# Patient Record
Sex: FEMALE | Race: WHITE | NOT HISPANIC OR LATINO | Employment: FULL TIME | URBAN - NONMETROPOLITAN AREA
[De-identification: names, ages, dates, MRNs, and addresses within clinical notes are randomized per-mention and may not be internally consistent; named-entity substitution may affect disease eponyms.]

---

## 2021-11-09 ENCOUNTER — OFFICE VISIT (OUTPATIENT)
Dept: FAMILY MEDICINE CLINIC | Age: 31
End: 2021-11-09

## 2021-11-09 VITALS
HEIGHT: 63 IN | WEIGHT: 288.2 LBS | DIASTOLIC BLOOD PRESSURE: 79 MMHG | HEART RATE: 66 BPM | BODY MASS INDEX: 51.07 KG/M2 | SYSTOLIC BLOOD PRESSURE: 128 MMHG

## 2021-11-09 DIAGNOSIS — F41.9 ANXIETY: ICD-10-CM

## 2021-11-09 DIAGNOSIS — G56.03 BILATERAL CARPAL TUNNEL SYNDROME: Primary | ICD-10-CM

## 2021-11-09 PROCEDURE — 99203 OFFICE O/P NEW LOW 30 MIN: CPT | Performed by: NURSE PRACTITIONER

## 2021-11-09 RX ORDER — BUSPIRONE HYDROCHLORIDE 5 MG/1
5 TABLET ORAL 3 TIMES DAILY PRN
Qty: 30 TABLET | Refills: 1 | Status: SHIPPED | OUTPATIENT
Start: 2021-11-09 | End: 2023-03-08

## 2021-11-09 NOTE — PROGRESS NOTES
Chief Complaint  Wrist Pain (right worse than left)    Subjective    Patient is a 31 year old female in today with complaints of increased wrist pain that is keeping her up at night. Patient reports being diagnosed with carpel tunnel in the past and has tried braces without relief. Patient reports moderate relief with ibuprofen.    Patient also reports increased anxiety and feelings of being overwhelmed. Patient reports being a mother of four and recently moving states and life is a lot right now. Patient has been treated for depression in the past but came off of medications due to ineffectiveness and side effects. Patient reports she has never been to a counselor before.          Preston Walters presents to [facility]  Wrist Pain   The pain is present in the left wrist and right wrist. This is a chronic problem. The current episode started more than 1 month ago. The problem occurs intermittently. The problem has been waxing and waning. The quality of the pain is described as aching and burning. The pain is at a severity of 7/10. The pain is moderate. Associated symptoms include joint swelling, numbness, stiffness and tingling. Pertinent negatives include no fever. The symptoms are aggravated by lying down. She has tried NSAIDS, acetaminophen and rest for the symptoms. The treatment provided mild relief.   Anxiety  Presents for initial visit. Onset was more than 5 years ago. The problem has been gradually worsening. Symptoms include depressed mood, excessive worry, insomnia, nervous/anxious behavior and restlessness. Patient reports no chest pain or suicidal ideas. Symptoms occur most days. The severity of symptoms is moderate. The symptoms are aggravated by family issues. The quality of sleep is poor. Nighttime awakenings: occasional.     Risk factors include family history and a major life event. Her past medical history is significant for anxiety/panic attacks and depression.  "Past treatments include non-SSRI antidepressants, SSRIs and lifestyle changes. The treatment provided mild relief. Compliance with prior treatments has been good.       Objective   Vital Signs:   /79 (BP Location: Left arm, Patient Position: Sitting)   Pulse 66   Ht 160 cm (63\")   Wt 131 kg (288 lb 3.2 oz)   BMI 51.05 kg/m²     Physical Exam  HENT:      Head: Normocephalic.   Cardiovascular:      Rate and Rhythm: Normal rate and regular rhythm.      Pulses: Normal pulses.      Heart sounds: Normal heart sounds.   Pulmonary:      Effort: Pulmonary effort is normal. No respiratory distress.      Breath sounds: Normal breath sounds. No stridor. No wheezing, rhonchi or rales.   Musculoskeletal:      Right wrist: Tenderness and bony tenderness present. No swelling. Normal pulse.      Left wrist: Tenderness and bony tenderness present. No swelling. Normal pulse.   Skin:     General: Skin is warm and dry.   Neurological:      Mental Status: She is alert and oriented to person, place, and time.   Psychiatric:         Mood and Affect: Mood normal.        Result Review :                 Assessment and Plan    Diagnoses and all orders for this visit:    1. Bilateral carpal tunnel syndrome (Primary)  -     diclofenac (VOLTAREN) 50 MG EC tablet; Take 1 tablet by mouth 2 (Two) Times a Day As Needed (Wrist pain).  Dispense: 60 tablet; Refill: 1  -     Ambulatory Referral to Orthopedic Surgery    2. Anxiety  -     Ambulatory Referral to Psychiatry  -     busPIRone (BUSPAR) 5 MG tablet; Take 1 tablet by mouth 3 (Three) Times a Day As Needed (Anxiety).  Dispense: 30 tablet; Refill: 1        Follow Up   Return in 3 months (on 2/9/2022), or if symptoms worsen or fail to improve.  Patient was given instructions and counseling regarding her condition or for health maintenance advice. Please see specific information pulled into the AVS if appropriate.       "

## 2021-11-10 ENCOUNTER — TELEPHONE (OUTPATIENT)
Dept: FAMILY MEDICINE CLINIC | Age: 31
End: 2021-11-10

## 2021-11-10 NOTE — TELEPHONE ENCOUNTER
Spoke to pt and inf her that Luci is not here today but to not take either med and will check and call her back tomorrow

## 2021-11-10 NOTE — TELEPHONE ENCOUNTER
Caller: Preston Walters    Relationship: Self    Best call back number: [phone]    What is the best time to reach you: ANY    Who are you requesting to speak with (clinical staff, provider,  specific staff member): CLINICAL STAFF    What was the call regarding: PATIENT FOUND OUT SHE WAS PREGNANT AND WOULD LIKE TO SPEAK TO A NURSE ABOUT IF SHE IS SAFE TO TAKE HER MEDICATION    Do you require a callback: YES

## 2021-11-11 NOTE — TELEPHONE ENCOUNTER
Stop diclofenac, not advised during 1st trimester, buspirone not contraindicated. Keep scheduled appointment with ortho and psych to discuss appropriate treatments during pregnancy.

## 2021-11-29 ENCOUNTER — HOSPITAL ENCOUNTER (EMERGENCY)
Dept: HOSPITAL 49 - FER | Age: 31
LOS: 1 days | Discharge: HOME | End: 2021-11-30
Payer: COMMERCIAL

## 2021-11-29 DIAGNOSIS — Z23: ICD-10-CM

## 2021-11-29 DIAGNOSIS — Z3A.01: ICD-10-CM

## 2021-11-29 DIAGNOSIS — O98.511: Primary | ICD-10-CM

## 2021-11-29 DIAGNOSIS — O20.0: ICD-10-CM

## 2021-11-29 DIAGNOSIS — U07.1: ICD-10-CM

## 2021-11-29 DIAGNOSIS — Z88.7: ICD-10-CM

## 2021-11-29 PROCEDURE — M0243 CASIRIVI AND IMDEVI INFUSION: HCPCS

## 2021-11-29 PROCEDURE — U0002 COVID-19 LAB TEST NON-CDC: HCPCS

## 2021-11-30 LAB
ALBUMIN SERPL-MCNC: 3.4 G/DL (ref 3.4–5)
ALKALINE PHOSHATASE: 69 U/L (ref 46–116)
ALT SERPL-CCNC: 63 U/L (ref 14–59)
AST: 40 U/L (ref 15–37)
BILIRUBIN - TOTAL: 0.2 MG/DL (ref 0.2–1)
BILIRUBIN: NEGATIVE MG/DL
BLOOD: NEGATIVE ERY/UL
BUN SERPL-MCNC: 11 MG/DL (ref 7–18)
BUN/CREAT RATIO (CALC): 14.1 RATIO
CHLORIDE: 98 MMOL/L (ref 98–107)
CLARITY UR: CLEAR
CO2 (BICARBONATE): 27 MMOL/L (ref 21–32)
COLOR: YELLOW
CREATININE: 0.78 MG/DL (ref 0.51–0.95)
GLOBULIN (CALCULATION): 4.4 G/DL
GLUCOSE (U): NORMAL MG/DL
GLUCOSE SERPL-MCNC: 97 MG/DL (ref 74–106)
HCT: 40.1 % (ref 37–47)
HGB BLD-MCNC: 12.7 G/DL (ref 12.5–16)
LEUKOCYTES: NEGATIVE LEU/UL
MCH RBC QN AUTO: 28 PG (ref 25–31)
MCHC RBC AUTO-ENTMCNC: 31.7 G/DL (ref 32–36)
MCV: 88.5 FL (ref 78–100)
MPV: 11.7 FL (ref 6–9.5)
NITRITE: NEGATIVE MG/DL
PLT: 160 K/UL (ref 150–400)
POTASSIUM: 3.1 MMOL/L (ref 3.5–5.1)
PROTEIN: NEGATIVE MG/DL
RBC: 4.53 M/UL (ref 4.2–5.4)
RDW: 13.2 % (ref 11.5–14)
SPECIFIC GRAVITY: 1.02 (ref 1–1.03)
TOTAL PROTEIN: 7.8 G/DL (ref 6.4–8.2)
UROBILINOGEN: 0.2 MG/DL (ref 0.2–1)
WBC: 4.4 K/UL (ref 4–10.5)

## 2022-02-11 ENCOUNTER — TELEPHONE (OUTPATIENT)
Dept: FAMILY MEDICINE CLINIC | Age: 32
End: 2022-02-11

## 2022-02-11 NOTE — TELEPHONE ENCOUNTER
Caller: Preston Walters    Relationship: Self    Best call back number: [phone]    What medication are you requesting: PERMETHRIN CREAM     What are your current symptoms: PATIENT RECENTLY GOT A PUPPY AND THE VET CONFIRMED THE PUPPY HAS SCABIES. PATIENT DOES NOT HAVE ANY SYMPTOMS OF SCABIES BUT WANTS TO BE CAUTIOUS DUE TO BEING PREGNANT. PATIENT WOULD LIKE THIS CREAM JUST IN CASE SCABIES START TO DEVELOP      Have you had these symptoms before:    [x] Yes  [] No    Have you been treated for these symptoms before:   [x] Yes  [] No    If a prescription is needed, what is your preferred pharmacy and phone number:      [facility] - [place], [place] - [address] - [phone]  - [phone]   [phone]

## 2022-02-14 DIAGNOSIS — Z20.7 SCABIES EXPOSURE: Primary | ICD-10-CM

## 2022-02-14 RX ORDER — PERMETHRIN 50 MG/G
1 CREAM TOPICAL ONCE
Qty: 60 G | Refills: 1 | Status: SHIPPED | OUTPATIENT
Start: 2022-02-14 | End: 2022-02-14

## 2022-02-18 ENCOUNTER — HOSPITAL ENCOUNTER (EMERGENCY)
Dept: HOSPITAL 49 - FER | Age: 32
Discharge: HOME | End: 2022-02-18
Payer: COMMERCIAL

## 2022-02-18 DIAGNOSIS — Z79.82: ICD-10-CM
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
## 2022-02-18 DIAGNOSIS — S05.02XA: Primary | ICD-10-CM

## 2023-03-08 ENCOUNTER — PROCEDURE VISIT (OUTPATIENT)
Dept: FAMILY MEDICINE CLINIC | Age: 33
End: 2023-03-08
Payer: COMMERCIAL

## 2023-03-08 VITALS
HEART RATE: 64 BPM | HEIGHT: 63 IN | OXYGEN SATURATION: 98 % | DIASTOLIC BLOOD PRESSURE: 81 MMHG | SYSTOLIC BLOOD PRESSURE: 123 MMHG | BODY MASS INDEX: 51.91 KG/M2 | WEIGHT: 293 LBS

## 2023-03-08 DIAGNOSIS — F41.9 ANXIETY: ICD-10-CM

## 2023-03-08 DIAGNOSIS — G89.29 CHRONIC PAIN OF RIGHT KNEE: ICD-10-CM

## 2023-03-08 DIAGNOSIS — G56.03 BILATERAL CARPAL TUNNEL SYNDROME: Primary | ICD-10-CM

## 2023-03-08 DIAGNOSIS — D64.9 ANEMIA, UNSPECIFIED TYPE: ICD-10-CM

## 2023-03-08 DIAGNOSIS — M25.561 CHRONIC PAIN OF RIGHT KNEE: ICD-10-CM

## 2023-03-08 PROCEDURE — 20605 DRAIN/INJ JOINT/BURSA W/O US: CPT | Performed by: NURSE PRACTITIONER

## 2023-03-08 PROCEDURE — 99214 OFFICE O/P EST MOD 30 MIN: CPT | Performed by: NURSE PRACTITIONER

## 2023-03-08 RX ORDER — SERTRALINE HYDROCHLORIDE 100 MG/1
100 TABLET, FILM COATED ORAL DAILY
Qty: 90 TABLET | Refills: 0 | Status: SHIPPED | OUTPATIENT
Start: 2023-03-08

## 2023-03-08 RX ORDER — IBUPROFEN 800 MG/1
800 TABLET ORAL EVERY 6 HOURS PRN
Qty: 40 TABLET | Refills: 0 | Status: SHIPPED | OUTPATIENT
Start: 2023-03-08

## 2023-03-08 RX ORDER — FERROUS SULFATE 325(65) MG
325 TABLET ORAL
Qty: 90 TABLET | Refills: 0 | Status: SHIPPED | OUTPATIENT
Start: 2023-03-08

## 2023-03-08 RX ADMIN — TRIAMCINOLONE ACETONIDE 40 MG: 40 INJECTION, SUSPENSION INTRA-ARTICULAR; INTRAMUSCULAR at 13:47

## 2023-03-08 RX ADMIN — TRIAMCINOLONE ACETONIDE 40 MG: 40 INJECTION, SUSPENSION INTRA-ARTICULAR; INTRAMUSCULAR at 13:53

## 2023-03-08 NOTE — PROGRESS NOTES
"Chief Complaint  Procedure (Wrist and knee pain)    Subjective        Preston Walters presents to [facility]  Wrist Pain   The pain is present in the left wrist and right wrist. This is a recurrent problem. The current episode started more than 1 month ago. The quality of the pain is described as aching and burning. The pain is at a severity of 10/10. The pain is severe. Associated symptoms include joint locking, joint swelling, numbness, stiffness and tingling. She has tried NSAIDS, rest and cold (Braces) for the symptoms. The treatment provided no relief.   Anxiety  Presents for follow-up visit. Symptoms include excessive worry, irritability and nervous/anxious behavior. Symptoms occur most days. The severity of symptoms is moderate. The quality of sleep is fair.           Objective   Vital Signs:  /81 (BP Location: Left arm, Patient Position: Sitting, Cuff Size: Large Adult)   Pulse 64   Ht 160 cm (62.99\")   Wt 135 kg (297 lb 2 oz)   SpO2 98%   BMI 52.65 kg/m²   Estimated body mass index is 52.65 kg/m² as calculated from the following:    Height as of this encounter: 160 cm (62.99\").    Weight as of this encounter: 135 kg (297 lb 2 oz).             Physical Exam  HENT:      Head: Normocephalic.   Cardiovascular:      Rate and Rhythm: Normal rate and regular rhythm.   Pulmonary:      Effort: Pulmonary effort is normal. No respiratory distress.      Breath sounds: Normal breath sounds. No stridor. No wheezing, rhonchi or rales.   Musculoskeletal:      Right wrist: Tenderness present. Normal pulse.      Left wrist: Tenderness and crepitus present. Normal pulse.      Right knee: Swelling and crepitus present. No erythema. Normal pulse.   Skin:     General: Skin is warm.   Neurological:      Mental Status: She is alert and oriented to person, place, and time.   Psychiatric:         Mood and Affect: Mood normal.        Result Review :            Joint " Aspiration/Injection    Date/Time: 3/8/2023 1:47 PM  Performed by: Luci Sanchez APRN  Authorized by: Luci Sanchez APRN   Indications: pain   Body area: wrist  Joint: right wrist  Local anesthesia used: no    Anesthesia:  Local anesthesia used: no    Sedation:  Patient sedated: no    Preparation: Patient was prepped and draped in the usual sterile fashion.  Meds administered: 40 mg triamcinolone acetonide 40 MG/ML  Patient tolerance: patient tolerated the procedure well with no immediate complications  Comments: Discussed procedure, alternatives, possible complications and answered all questions. Written consent was obtained. Patient was in sitting position. No complications. Patient ambulatory at discharge.   1 cc 1% lidocaine NDC 23877-779-86 [id number] MAY 2025    Joint Aspiration/Injection    Date/Time: 3/8/2023 1:53 PM  Performed by: Luic Sanchez APRN  Authorized by: Luci Sanchez APRN   Indications: pain   Body area: wrist  Joint: left wrist  Local anesthesia used: no    Anesthesia:  Local anesthesia used: no    Sedation:  Patient sedated: no    Preparation: Patient was prepped and draped in the usual sterile fashion.  Needle size: 22 G  Meds administered: 40 mg triamcinolone acetonide 40 MG/ML  Patient tolerance: patient tolerated the procedure well with no immediate complications  Comments: Discussed procedure, alternatives, possible complications and answered all questions. Written consent was obtained. Patient was in sitting position. No complications. Patient ambulatory at discharge.   1 cc 1% lidocaine            Assessment and Plan   Diagnoses and all orders for this visit:    1. Bilateral carpal tunnel syndrome (Primary)  Comments:  Rest, ice, continue braces at night  Orders:  -     Joint Aspiration/Injection  -     Joint Aspiration/Injection    2. Anxiety  Comments:  Improved, would like to try increased dose of zoloft, follow up in 3 months  Orders:  -     sertraline (ZOLOFT) 100 MG  tablet; Take 1 tablet by mouth Daily.  Dispense: 90 tablet; Refill: 0    3. Chronic pain of right knee  -     ibuprofen (ADVIL,MOTRIN) 800 MG tablet; Take 1 tablet by mouth Every 6 (Six) Hours As Needed for Moderate Pain (Knee pain).  Dispense: 40 tablet; Refill: 0    4. Anemia, unspecified type  -     ferrous sulfate (FerrouSul) 325 (65 FE) MG tablet; Take 1 tablet by mouth Daily With Breakfast.  Dispense: 90 tablet; Refill: 0             Follow Up   Return in 3 months (on 6/8/2023), or if symptoms worsen or fail to improve.  Patient was given instructions and counseling regarding her condition or for health maintenance advice. Please see specific information pulled into the AVS if appropriate.

## 2023-03-10 RX ORDER — TRIAMCINOLONE ACETONIDE 40 MG/ML
40 INJECTION, SUSPENSION INTRA-ARTICULAR; INTRAMUSCULAR
Status: COMPLETED | OUTPATIENT
Start: 2023-03-08 | End: 2023-03-08

## 2023-03-27 ENCOUNTER — OFFICE VISIT (OUTPATIENT)
Dept: FAMILY MEDICINE CLINIC | Age: 33
End: 2023-03-27
Payer: COMMERCIAL

## 2023-03-27 VITALS
SYSTOLIC BLOOD PRESSURE: 136 MMHG | WEIGHT: 293 LBS | DIASTOLIC BLOOD PRESSURE: 77 MMHG | OXYGEN SATURATION: 98 % | TEMPERATURE: 98.3 F | HEIGHT: 63 IN | BODY MASS INDEX: 51.91 KG/M2 | HEART RATE: 60 BPM

## 2023-03-27 DIAGNOSIS — T36.95XA ANTIBIOTIC-INDUCED YEAST INFECTION: ICD-10-CM

## 2023-03-27 DIAGNOSIS — R05.9 COUGH, UNSPECIFIED TYPE: ICD-10-CM

## 2023-03-27 DIAGNOSIS — B37.9 ANTIBIOTIC-INDUCED YEAST INFECTION: ICD-10-CM

## 2023-03-27 DIAGNOSIS — H66.93 OTITIS OF BOTH EARS: Primary | ICD-10-CM

## 2023-03-27 DIAGNOSIS — J02.9 SORE THROAT: ICD-10-CM

## 2023-03-27 LAB
EXPIRATION DATE: NORMAL
EXPIRATION DATE: NORMAL
FLUAV AG UPPER RESP QL IA.RAPID: NOT DETECTED
FLUBV AG UPPER RESP QL IA.RAPID: NOT DETECTED
INTERNAL CONTROL: NORMAL
INTERNAL CONTROL: NORMAL
Lab: NORMAL
Lab: NORMAL
S PYO AG THROAT QL: NEGATIVE
SARS-COV-2 AG UPPER RESP QL IA.RAPID: NOT DETECTED

## 2023-03-27 PROCEDURE — 87081 CULTURE SCREEN ONLY: CPT | Performed by: NURSE PRACTITIONER

## 2023-03-27 RX ORDER — AMOXICILLIN 500 MG/1
500 CAPSULE ORAL 2 TIMES DAILY
Qty: 20 CAPSULE | Refills: 0 | Status: SHIPPED | OUTPATIENT
Start: 2023-03-27 | End: 2023-04-06

## 2023-03-27 RX ORDER — FLUCONAZOLE 150 MG/1
150 TABLET ORAL ONCE
Qty: 1 TABLET | Refills: 0 | Status: SHIPPED | OUTPATIENT
Start: 2023-03-27 | End: 2023-03-27

## 2023-03-27 NOTE — PROGRESS NOTES
"Chief Complaint  Sore Throat (Sx started 3 days ago), Headache, and Cough    Subjective        Preston Walters presents to [facility]  Sore Throat   This is a new problem. The current episode started in the past 7 days. The problem has been unchanged. Associated symptoms include congestion, coughing, headaches, a plugged ear sensation and swollen glands. Pertinent negatives include no shortness of breath or trouble swallowing. She has had no exposure to strep. She has tried acetaminophen and NSAIDs for the symptoms. The treatment provided mild relief.   Cough  This is a new problem. The current episode started in the past 7 days. The cough is productive of sputum. Associated symptoms include headaches and a sore throat. Pertinent negatives include no chills, fever or shortness of breath.       Objective   Vital Signs:  /77 (BP Location: Left arm, Patient Position: Sitting, Cuff Size: Adult)   Pulse 60   Temp 98.3 °F (36.8 °C) (Oral)   Ht 160 cm (62.99\")   Wt 135 kg (297 lb)   SpO2 98% Comment: room air  BMI 52.63 kg/m²   Estimated body mass index is 52.63 kg/m² as calculated from the following:    Height as of this encounter: 160 cm (62.99\").    Weight as of this encounter: 135 kg (297 lb).             Physical Exam  HENT:      Head: Normocephalic.      Right Ear: A middle ear effusion is present. Tympanic membrane is injected and erythematous.      Left Ear: Decreased hearing noted. A middle ear effusion is present. Tympanic membrane is injected, erythematous and bulging.      Nose: Congestion present.      Mouth/Throat:      Pharynx: Posterior oropharyngeal erythema present. No oropharyngeal exudate.   Cardiovascular:      Rate and Rhythm: Normal rate and regular rhythm.   Pulmonary:      Effort: Pulmonary effort is normal. No respiratory distress.      Breath sounds: Normal breath sounds. No stridor. No wheezing, rhonchi or rales.   Lymphadenopathy:      Cervical: " Cervical adenopathy present.   Skin:     General: Skin is warm and dry.   Neurological:      Mental Status: She is alert and oriented to person, place, and time.   Psychiatric:         Mood and Affect: Mood normal.        Result Review :          Results for orders placed or performed in visit on 03/27/23   POCT rapid strep A    Specimen: Swab   Result Value Ref Range    Rapid Strep A Screen Negative Negative, VALID, INVALID, Not Performed    Internal Control Passed Passed    Lot Number [id number]     Expiration Date 6/30/24    POCT SARS-CoV-2 Antigen CABRERA + Flu    Specimen: Swab   Result Value Ref Range    SARS Antigen Not Detected Not Detected, Presumptive Negative    Influenza A Antigen CABRERA Not Detected Not Detected    Influenza B Antigen CABRERA Not Detected Not Detected    Internal Control Passed Passed    Lot Number [id number]     Expiration Date 12/19/23               Assessment and Plan   Diagnoses and all orders for this visit:    1. Otitis of both ears (Primary)  -     amoxicillin (AMOXIL) 500 MG capsule; Take 1 capsule by mouth 2 (Two) Times a Day for 10 days.  Dispense: 20 capsule; Refill: 0    2. Sore throat  -     POCT SARS-CoV-2 Antigen CABRERA + Flu  -     Beta Strep Culture, Throat - , Throat; Future  -     Beta Strep Culture, Throat - Swab, Throat    3. Cough, unspecified type  -     POCT rapid strep A    4. Antibiotic-induced yeast infection  -     fluconazole (Diflucan) 150 MG tablet; Take 1 tablet by mouth 1 (One) Time for 1 dose.  Dispense: 1 tablet; Refill: 0             Follow Up   Return if symptoms worsen or fail to improve.  Patient was given instructions and counseling regarding her condition or for health maintenance advice. Please see specific information pulled into the AVS if appropriate.

## 2023-03-29 LAB — BACTERIA SPEC AEROBE CULT: NORMAL

## 2023-04-24 ENCOUNTER — HOSPITAL ENCOUNTER (OUTPATIENT)
Dept: GENERAL RADIOLOGY | Facility: HOSPITAL | Age: 33
Discharge: HOME | End: 2023-04-24
Admitting: NURSE PRACTITIONER
Payer: COMMERCIAL

## 2023-04-24 ENCOUNTER — OFFICE VISIT (OUTPATIENT)
Dept: FAMILY MEDICINE CLINIC | Age: 33
End: 2023-04-24
Payer: COMMERCIAL

## 2023-04-24 VITALS
BODY MASS INDEX: 51.91 KG/M2 | SYSTOLIC BLOOD PRESSURE: 126 MMHG | OXYGEN SATURATION: 97 % | HEART RATE: 63 BPM | WEIGHT: 293 LBS | DIASTOLIC BLOOD PRESSURE: 84 MMHG | HEIGHT: 63 IN

## 2023-04-24 DIAGNOSIS — M25.561 CHRONIC PAIN OF RIGHT KNEE: Primary | ICD-10-CM

## 2023-04-24 DIAGNOSIS — G89.29 CHRONIC PAIN OF RIGHT KNEE: Primary | ICD-10-CM

## 2023-04-24 DIAGNOSIS — F41.9 ANXIETY: ICD-10-CM

## 2023-04-24 DIAGNOSIS — G89.29 CHRONIC PAIN OF RIGHT KNEE: ICD-10-CM

## 2023-04-24 DIAGNOSIS — M25.561 CHRONIC PAIN OF RIGHT KNEE: ICD-10-CM

## 2023-04-24 PROCEDURE — 1159F MED LIST DOCD IN RCRD: CPT | Performed by: NURSE PRACTITIONER

## 2023-04-24 PROCEDURE — 1160F RVW MEDS BY RX/DR IN RCRD: CPT | Performed by: NURSE PRACTITIONER

## 2023-04-24 PROCEDURE — 73562 X-RAY EXAM OF KNEE 3: CPT

## 2023-04-24 RX ORDER — MELOXICAM 15 MG/1
15 TABLET ORAL DAILY
Qty: 90 TABLET | Refills: 0 | Status: SHIPPED | OUTPATIENT
Start: 2023-04-24

## 2023-04-24 NOTE — PROGRESS NOTES
"Chief Complaint  Knee Pain (Right knee pain worsening ongoing problem for \"years\")    Subjective        Preston Walters presents to [facility]  Knee Pain   The pain is present in the right knee and right thigh. The quality of the pain is described as aching. The pain is at a severity of 8/10. The pain has been worsening since onset. Pertinent negatives include no numbness or tingling. She reports no foreign bodies present. The symptoms are aggravated by movement and weight bearing. She has tried NSAIDs, heat and ice for the symptoms. The treatment provided mild relief.       Objective   Vital Signs:  /84 (BP Location: Left arm, Patient Position: Sitting, Cuff Size: Large Adult)   Pulse 63   Ht 160 cm (63\")   Wt 135 kg (298 lb)   SpO2 97%   BMI 52.79 kg/m²   Estimated body mass index is 52.79 kg/m² as calculated from the following:    Height as of this encounter: 160 cm (63\").    Weight as of this encounter: 135 kg (298 lb).             Physical Exam  Constitutional:       Appearance: She is obese.   HENT:      Head: Normocephalic.   Musculoskeletal:      Right knee: Swelling and crepitus present. No erythema. Tenderness present. Normal pulse.   Skin:     General: Skin is warm and dry.   Neurological:      Mental Status: She is alert and oriented to person, place, and time.   Psychiatric:         Mood and Affect: Mood normal.        Result Review :          PROCEDURE:  XR KNEE 3 VW RIGHT  (NONWEIGHTBEARING)     COMPARISON: None.     INDICATIONS:  Chronic generalized right knee pain.     FINDINGS:          BONES:             No significant arthropathy or acute abnormality.    SOFT TISSUES:            No visible soft tissue swelling.  No subcutaneous emphysema.  No retained radiopaque   foreign body.  EFFUSION:       Minimal, if any, joint effusion is suggested.  OTHER:             Negative.  If symptoms or clinical concerns persist, consider imaging follow-up.     "   IMPRESSION:               No acute fracture or acute malalignment.         Assessment and Plan   Diagnoses and all orders for this visit:    1. Chronic pain of right knee (Primary)  Comments:  Do not take ibuprofen with mobic, may get injection in June, compression, ice and elavtion   Orders:  -     XR Knee 3 View Right; Future  -     Diclofenac Sodium (VOLTAREN) 1 % gel gel; Apply 4 g topically to the appropriate area as directed 4 (Four) Times a Day As Needed (Knee pain).  Dispense: 350 g; Refill: 0  -     meloxicam (Mobic) 15 MG tablet; Take 1 tablet by mouth Daily.  Dispense: 90 tablet; Refill: 0    2. Anxiety  Comments:  Reports weight gain with zoloft would like to try wellbutrin, follow up in 3 months  Orders:  -     sertraline (ZOLOFT) 100 MG tablet; Take 0.5 tablets by mouth Daily.  Dispense: 7 tablet; Refill: 0  -     buPROPion XL (Wellbutrin XL) 150 MG 24 hr tablet; Take 1 tablet by mouth Daily.  Dispense: 90 tablet; Refill: 1    Will add Mobic and topical agent to decrease the amount of ibuprofen the patient is utilizing.  Discussed risk of frequent NSAID use.  Patient received carpal tunnel injection in March, can do knee joint injection as soon as June, patient will call make an appointment.  Will obtain x-ray today.  Discussed next best step such as physical therapy and orthopedics.  Patient wishes to avoid at this time due to lack of time to be off of work for appointments.         Follow Up   Return if symptoms worsen or fail to improve.  Patient was given instructions and counseling regarding her condition or for health maintenance advice. Please see specific information pulled into the AVS if appropriate.

## 2023-05-02 ENCOUNTER — TELEPHONE (OUTPATIENT)
Dept: FAMILY MEDICINE CLINIC | Age: 33
End: 2023-05-02

## 2023-05-02 PROBLEM — F32.A DEPRESSION AFFECTING PREGNANCY: Status: ACTIVE | Noted: 2021-12-23

## 2023-05-02 PROBLEM — O99.340 DEPRESSION AFFECTING PREGNANCY: Status: ACTIVE | Noted: 2021-12-23

## 2023-05-02 PROBLEM — U07.1 COVID-19 AFFECTING PREGNANCY IN FIRST TRIMESTER: Status: ACTIVE | Noted: 2022-01-20

## 2023-05-02 PROBLEM — O98.511 COVID-19 AFFECTING PREGNANCY IN FIRST TRIMESTER: Status: ACTIVE | Noted: 2022-01-20

## 2023-05-02 PROBLEM — O26.899 RH NEGATIVE STATE IN ANTEPARTUM PERIOD: Status: ACTIVE | Noted: 2021-11-23

## 2023-05-02 PROBLEM — O09.299 CURRENT SINGLETON PREGNANCY WITH HISTORY OF CONGENITAL HEART DISEASE IN PRIOR CHILD, ANTEPARTUM: Status: ACTIVE | Noted: 2020-04-21

## 2023-05-02 PROBLEM — O99.810 ABNORMAL O'SULLIVAN GLUCOSE CHALLENGE TEST, ANTEPARTUM: Status: ACTIVE | Noted: 2021-12-26

## 2023-05-02 PROBLEM — Z67.91 RH NEGATIVE STATE IN ANTEPARTUM PERIOD: Status: ACTIVE | Noted: 2021-11-23

## 2023-05-02 PROBLEM — Z34.90 PREGNANCY: Status: ACTIVE | Noted: 2019-10-18

## 2023-05-02 PROBLEM — O09.299 HISTORY OF GESTATIONAL DIABETES IN PRIOR PREGNANCY, CURRENTLY PREGNANT: Status: ACTIVE | Noted: 2021-12-23

## 2023-05-02 PROBLEM — O24.419 GESTATIONAL DIABETES MELLITUS: Status: ACTIVE | Noted: 2022-05-09

## 2023-05-02 PROBLEM — O09.299: Status: ACTIVE | Noted: 2021-12-23

## 2023-05-02 PROBLEM — E66.01 MORBID OBESITY: Status: ACTIVE | Noted: 2022-05-09

## 2023-05-02 PROBLEM — Z86.32 HISTORY OF GESTATIONAL DIABETES IN PRIOR PREGNANCY, CURRENTLY PREGNANT: Status: ACTIVE | Noted: 2021-12-23

## 2023-05-02 RX ORDER — SERTRALINE HYDROCHLORIDE 100 MG/1
50 TABLET, FILM COATED ORAL DAILY
Qty: 7 TABLET | Refills: 0 | Status: SHIPPED | OUTPATIENT
Start: 2023-05-02

## 2023-05-02 RX ORDER — BUPROPION HYDROCHLORIDE 150 MG/1
150 TABLET ORAL DAILY
Qty: 90 TABLET | Refills: 1 | Status: SHIPPED | OUTPATIENT
Start: 2023-05-02

## 2023-05-15 ENCOUNTER — OFFICE VISIT (OUTPATIENT)
Dept: FAMILY MEDICINE CLINIC | Age: 33
End: 2023-05-15
Payer: COMMERCIAL

## 2023-05-15 ENCOUNTER — LAB (OUTPATIENT)
Dept: LAB | Facility: HOSPITAL | Age: 33
End: 2023-05-15
Payer: COMMERCIAL

## 2023-05-15 VITALS
TEMPERATURE: 97.9 F | HEIGHT: 63 IN | WEIGHT: 293 LBS | HEART RATE: 64 BPM | DIASTOLIC BLOOD PRESSURE: 84 MMHG | SYSTOLIC BLOOD PRESSURE: 143 MMHG | BODY MASS INDEX: 51.91 KG/M2

## 2023-05-15 DIAGNOSIS — G89.29 CHRONIC PAIN OF RIGHT KNEE: ICD-10-CM

## 2023-05-15 DIAGNOSIS — F41.1 GAD (GENERALIZED ANXIETY DISORDER): ICD-10-CM

## 2023-05-15 DIAGNOSIS — Z00.00 ROUTINE ADULT HEALTH MAINTENANCE: ICD-10-CM

## 2023-05-15 DIAGNOSIS — Z13.220 SCREENING FOR LIPID DISORDERS: ICD-10-CM

## 2023-05-15 DIAGNOSIS — R53.83 OTHER FATIGUE: ICD-10-CM

## 2023-05-15 DIAGNOSIS — Z13.29 SCREENING FOR THYROID DISORDER: ICD-10-CM

## 2023-05-15 DIAGNOSIS — R73.01 ELEVATED FASTING GLUCOSE: ICD-10-CM

## 2023-05-15 DIAGNOSIS — R03.0 ELEVATED BLOOD PRESSURE READING: ICD-10-CM

## 2023-05-15 DIAGNOSIS — Z00.00 ENCOUNTER FOR MEDICAL EXAMINATION TO ESTABLISH CARE: Primary | ICD-10-CM

## 2023-05-15 DIAGNOSIS — D64.9 ANEMIA, UNSPECIFIED TYPE: ICD-10-CM

## 2023-05-15 DIAGNOSIS — M25.561 CHRONIC PAIN OF RIGHT KNEE: ICD-10-CM

## 2023-05-15 DIAGNOSIS — R09.82 PND (POST-NASAL DRIP): ICD-10-CM

## 2023-05-15 DIAGNOSIS — E55.9 VITAMIN D DEFICIENCY: ICD-10-CM

## 2023-05-15 DIAGNOSIS — E66.01 MORBID OBESITY: ICD-10-CM

## 2023-05-15 PROBLEM — O09.299 HISTORY OF GESTATIONAL DIABETES IN PRIOR PREGNANCY, CURRENTLY PREGNANT: Status: RESOLVED | Noted: 2021-12-23 | Resolved: 2023-05-15

## 2023-05-15 PROBLEM — Z34.90 PREGNANCY: Status: RESOLVED | Noted: 2019-10-18 | Resolved: 2023-05-15

## 2023-05-15 PROBLEM — F32.A DEPRESSION AFFECTING PREGNANCY: Status: RESOLVED | Noted: 2021-12-23 | Resolved: 2023-05-15

## 2023-05-15 PROBLEM — O24.419 GESTATIONAL DIABETES MELLITUS: Status: RESOLVED | Noted: 2022-05-09 | Resolved: 2023-05-15

## 2023-05-15 PROBLEM — O99.810 ABNORMAL O'SULLIVAN GLUCOSE CHALLENGE TEST, ANTEPARTUM: Status: RESOLVED | Noted: 2021-12-26 | Resolved: 2023-05-15

## 2023-05-15 PROBLEM — U07.1 COVID-19 AFFECTING PREGNANCY IN FIRST TRIMESTER: Status: RESOLVED | Noted: 2022-01-20 | Resolved: 2023-05-15

## 2023-05-15 PROBLEM — O09.299: Status: RESOLVED | Noted: 2021-12-23 | Resolved: 2023-05-15

## 2023-05-15 PROBLEM — F41.9 ANXIETY: Status: RESOLVED | Noted: 2021-11-09 | Resolved: 2023-05-15

## 2023-05-15 PROBLEM — O09.299 CURRENT SINGLETON PREGNANCY WITH HISTORY OF CONGENITAL HEART DISEASE IN PRIOR CHILD, ANTEPARTUM: Status: RESOLVED | Noted: 2020-04-21 | Resolved: 2023-05-15

## 2023-05-15 PROBLEM — O26.899 RH NEGATIVE STATE IN ANTEPARTUM PERIOD: Status: RESOLVED | Noted: 2021-11-23 | Resolved: 2023-05-15

## 2023-05-15 PROBLEM — O98.511 COVID-19 AFFECTING PREGNANCY IN FIRST TRIMESTER: Status: RESOLVED | Noted: 2022-01-20 | Resolved: 2023-05-15

## 2023-05-15 PROBLEM — O99.340 DEPRESSION AFFECTING PREGNANCY: Status: RESOLVED | Noted: 2021-12-23 | Resolved: 2023-05-15

## 2023-05-15 PROBLEM — Z67.91 RH NEGATIVE STATE IN ANTEPARTUM PERIOD: Status: RESOLVED | Noted: 2021-11-23 | Resolved: 2023-05-15

## 2023-05-15 PROBLEM — Z86.32 HISTORY OF GESTATIONAL DIABETES IN PRIOR PREGNANCY, CURRENTLY PREGNANT: Status: RESOLVED | Noted: 2021-12-23 | Resolved: 2023-05-15

## 2023-05-15 LAB
25(OH)D3 SERPL-MCNC: 17 NG/ML (ref 30–100)
ALBUMIN SERPL-MCNC: 3.9 G/DL (ref 3.5–5.2)
ALBUMIN/GLOB SERPL: 1.5 G/DL
ALP SERPL-CCNC: 73 U/L (ref 39–117)
ALT SERPL W P-5'-P-CCNC: 32 U/L (ref 1–33)
ANION GAP SERPL CALCULATED.3IONS-SCNC: 8.8 MMOL/L (ref 5–15)
AST SERPL-CCNC: 13 U/L (ref 1–32)
BASOPHILS # BLD AUTO: 0.04 10*3/MM3 (ref 0–0.2)
BASOPHILS NFR BLD AUTO: 0.4 % (ref 0–1.5)
BILIRUB SERPL-MCNC: <0.2 MG/DL (ref 0–1.2)
BUN SERPL-MCNC: 15 MG/DL (ref 6–20)
BUN/CREAT SERPL: 19.5 (ref 7–25)
CALCIUM SPEC-SCNC: 9.1 MG/DL (ref 8.6–10.5)
CHLORIDE SERPL-SCNC: 104 MMOL/L (ref 98–107)
CHOLEST SERPL-MCNC: 171 MG/DL (ref 0–200)
CO2 SERPL-SCNC: 26.2 MMOL/L (ref 22–29)
CREAT SERPL-MCNC: 0.77 MG/DL (ref 0.57–1)
DEPRECATED RDW RBC AUTO: 45 FL (ref 37–54)
EGFRCR SERPLBLD CKD-EPI 2021: 104 ML/MIN/1.73
EOSINOPHIL # BLD AUTO: 0.24 10*3/MM3 (ref 0–0.4)
EOSINOPHIL NFR BLD AUTO: 2.6 % (ref 0.3–6.2)
ERYTHROCYTE [DISTWIDTH] IN BLOOD BY AUTOMATED COUNT: 13.2 % (ref 12.3–15.4)
EXPIRATION DATE: NORMAL
FOLATE SERPL-MCNC: >20 NG/ML (ref 4.78–24.2)
GLOBULIN UR ELPH-MCNC: 2.6 GM/DL
GLUCOSE SERPL-MCNC: 109 MG/DL (ref 65–99)
HCT VFR BLD AUTO: 39 % (ref 34–46.6)
HDLC SERPL-MCNC: 47 MG/DL (ref 40–60)
HGB BLD-MCNC: 12.2 G/DL (ref 12–15.9)
IMM GRANULOCYTES # BLD AUTO: 0.01 10*3/MM3 (ref 0–0.05)
IMM GRANULOCYTES NFR BLD AUTO: 0.1 % (ref 0–0.5)
INTERNAL CONTROL: NORMAL
IRON 24H UR-MRATE: 64 MCG/DL (ref 37–145)
IRON SATN MFR SERPL: 14 % (ref 20–50)
LDLC SERPL CALC-MCNC: 106 MG/DL (ref 0–100)
LDLC/HDLC SERPL: 2.21 {RATIO}
LYMPHOCYTES # BLD AUTO: 2.39 10*3/MM3 (ref 0.7–3.1)
LYMPHOCYTES NFR BLD AUTO: 25.4 % (ref 19.6–45.3)
Lab: NORMAL
MCH RBC QN AUTO: 29 PG (ref 26.6–33)
MCHC RBC AUTO-ENTMCNC: 31.3 G/DL (ref 31.5–35.7)
MCV RBC AUTO: 92.6 FL (ref 79–97)
MONOCYTES # BLD AUTO: 0.59 10*3/MM3 (ref 0.1–0.9)
MONOCYTES NFR BLD AUTO: 6.3 % (ref 5–12)
NEUTROPHILS NFR BLD AUTO: 6.14 10*3/MM3 (ref 1.7–7)
NEUTROPHILS NFR BLD AUTO: 65.2 % (ref 42.7–76)
PLATELET # BLD AUTO: 293 10*3/MM3 (ref 140–450)
PMV BLD AUTO: 10.8 FL (ref 6–12)
POTASSIUM SERPL-SCNC: 4.9 MMOL/L (ref 3.5–5.2)
PROT SERPL-MCNC: 6.5 G/DL (ref 6–8.5)
RBC # BLD AUTO: 4.21 10*6/MM3 (ref 3.77–5.28)
S PYO AG THROAT QL: NEGATIVE
SODIUM SERPL-SCNC: 139 MMOL/L (ref 136–145)
TIBC SERPL-MCNC: 448 MCG/DL (ref 298–536)
TRANSFERRIN SERPL-MCNC: 301 MG/DL (ref 200–360)
TRIGL SERPL-MCNC: 101 MG/DL (ref 0–150)
TSH SERPL DL<=0.05 MIU/L-ACNC: 2.3 UIU/ML (ref 0.27–4.2)
VIT B12 BLD-MCNC: 475 PG/ML (ref 211–946)
VLDLC SERPL-MCNC: 18 MG/DL (ref 5–40)
WBC NRBC COR # BLD: 9.41 10*3/MM3 (ref 3.4–10.8)

## 2023-05-15 PROCEDURE — 36415 COLL VENOUS BLD VENIPUNCTURE: CPT

## 2023-05-15 PROCEDURE — 85025 COMPLETE CBC W/AUTO DIFF WBC: CPT

## 2023-05-15 PROCEDURE — 84443 ASSAY THYROID STIM HORMONE: CPT

## 2023-05-15 PROCEDURE — 84466 ASSAY OF TRANSFERRIN: CPT

## 2023-05-15 PROCEDURE — 83036 HEMOGLOBIN GLYCOSYLATED A1C: CPT

## 2023-05-15 PROCEDURE — 82306 VITAMIN D 25 HYDROXY: CPT

## 2023-05-15 PROCEDURE — 82607 VITAMIN B-12: CPT

## 2023-05-15 PROCEDURE — 80053 COMPREHEN METABOLIC PANEL: CPT

## 2023-05-15 PROCEDURE — 83540 ASSAY OF IRON: CPT

## 2023-05-15 PROCEDURE — 87081 CULTURE SCREEN ONLY: CPT | Performed by: NURSE PRACTITIONER

## 2023-05-15 PROCEDURE — 82746 ASSAY OF FOLIC ACID SERUM: CPT

## 2023-05-15 PROCEDURE — 80061 LIPID PANEL: CPT

## 2023-05-15 RX ORDER — IBUPROFEN 800 MG/1
800 TABLET ORAL EVERY 6 HOURS PRN
Qty: 40 TABLET | Refills: 0 | Status: SHIPPED | OUTPATIENT
Start: 2023-05-15

## 2023-05-15 NOTE — ASSESSMENT & PLAN NOTE
Since pt has been on wellbutrin for just 1 week, rec she stay on same dose for at least another 4-6 weeks. If BP not elevated, and she still feels increase is needed, we can adjust at follow up

## 2023-05-15 NOTE — ASSESSMENT & PLAN NOTE
Patient's (Body mass index is 52.61 kg/m².) indicates that they are morbidly/severely obese (BMI > 40 or > 35 with obesity - related health condition) with health conditions that include none . Weight is unchanged. BMI  is above average; BMI management plan is completed. We discussed portion control and increasing exercise.

## 2023-05-15 NOTE — PROGRESS NOTES
"Preston Walters presents to [facility] with complaint of  Anxiety (Discuss increasing Wellbutrin )    SUBJECTIVE  History of Present Illness     Patient is here to switch from DULCE Sanchez to myself. Her main reason for visit is to discuss depression. She was started on Wellbutrin 1 week ago and is wanting this medication increased. She does not express any side effects from medication. She is also needing motrin refilled. She says she uses this a few times per week for knee pain. She also gets knee injections. She also has been having a sore throat. No other symptoms.                          OBJECTIVE  Vital Signs:   /84 (BP Location: Left arm, Patient Position: Sitting)   Pulse 64   Temp 97.9 °F (36.6 °C) (Oral)   Ht 160 cm (63\")   Wt 135 kg (297 lb)   BMI 52.61 kg/m²       Physical Exam  Vitals reviewed.   Constitutional:       General: She is not in acute distress.     Appearance: Normal appearance. She is morbidly obese. She is not ill-appearing.   HENT:      Head: Normocephalic and atraumatic.      Nose: Nose normal.      Mouth/Throat:      Mouth: Mucous membranes are moist.      Pharynx: Oropharynx is clear.   Cardiovascular:      Rate and Rhythm: Normal rate and regular rhythm.      Pulses: Normal pulses.      Heart sounds: Normal heart sounds.   Pulmonary:      Effort: Pulmonary effort is normal.      Breath sounds: Normal breath sounds.   Musculoskeletal:      Cervical back: Neck supple.   Skin:     General: Skin is warm and dry.   Neurological:      General: No focal deficit present.      Mental Status: She is alert and oriented to person, place, and time. Mental status is at baseline.   Psychiatric:         Mood and Affect: Mood normal.         Behavior: Behavior normal.         Judgment: Judgment normal.        Results Review:   Lab on 05/15/2023   Component Date Value Ref Range Status   • WBC 05/15/2023 9.41  3.40 - 10.80 10*3/mm3 Final   • RBC 05/15/2023 4.21  " 3.77 - 5.28 10*6/mm3 Final   • Hemoglobin 05/15/2023 12.2  12.0 - 15.9 g/dL Final   • Hematocrit 05/15/2023 39.0  34.0 - 46.6 % Final   • MCV 05/15/2023 92.6  79.0 - 97.0 fL Final   • MCH 05/15/2023 29.0  26.6 - 33.0 pg Final   • MCHC 05/15/2023 31.3 (L)  31.5 - 35.7 g/dL Final   • RDW 05/15/2023 13.2  12.3 - 15.4 % Final   • RDW-SD 05/15/2023 45.0  37.0 - 54.0 fl Final   • MPV 05/15/2023 10.8  6.0 - 12.0 fL Final   • Platelets 05/15/2023 293  140 - 450 10*3/mm3 Final   • Neutrophil % 05/15/2023 65.2  42.7 - 76.0 % Final   • Lymphocyte % 05/15/2023 25.4  19.6 - 45.3 % Final   • Monocyte % 05/15/2023 6.3  5.0 - 12.0 % Final   • Eosinophil % 05/15/2023 2.6  0.3 - 6.2 % Final   • Basophil % 05/15/2023 0.4  0.0 - 1.5 % Final   • Immature Grans % 05/15/2023 0.1  0.0 - 0.5 % Final   • Neutrophils, Absolute 05/15/2023 6.14  1.70 - 7.00 10*3/mm3 Final   • Lymphocytes, Absolute 05/15/2023 2.39  0.70 - 3.10 10*3/mm3 Final   • Monocytes, Absolute 05/15/2023 0.59  0.10 - 0.90 10*3/mm3 Final   • Eosinophils, Absolute 05/15/2023 0.24  0.00 - 0.40 10*3/mm3 Final   • Basophils, Absolute 05/15/2023 0.04  0.00 - 0.20 10*3/mm3 Final   • Immature Grans, Absolute 05/15/2023 0.01  0.00 - 0.05 10*3/mm3 Final   Office Visit on 05/15/2023   Component Date Value Ref Range Status   • Rapid Strep A Screen 05/15/2023 Negative  Negative, VALID, INVALID, Not Performed Final   • Internal Control 05/15/2023 Passed  Passed Final   • Lot Number 05/15/2023 [id number]   Final   • Expiration Date 05/15/2023 6/30/24   Final         ASSESSMENT AND PLAN:  Diagnoses and all orders for this visit:    1. Encounter for medical examination to establish care (Primary)    2. PND (post-nasal drip)  Comments:  RSS neg, rec starting daily antihistamine, salt water gargles   Orders:  -     POCT rapid strep A  -     Beta Strep Culture, Throat - , Throat; Future  -     Beta Strep Culture, Throat - Swab, Throat    3. Chronic pain of right knee  -     ibuprofen  (ADVIL,MOTRIN) 800 MG tablet; Take 1 tablet by mouth Every 6 (Six) Hours As Needed for Moderate Pain (Knee pain).  Dispense: 40 tablet; Refill: 0    4. Routine adult health maintenance  -     CBC & Differential; Future  -     Comprehensive Metabolic Panel; Future    5. Screening for thyroid disorder  -     TSH; Future    6. Vitamin D deficiency  -     Vitamin D,25-Hydroxy; Future    7. Screening for lipid disorders  -     Lipid Panel; Future    8. Anemia, unspecified type  -     Iron Profile; Future    9. Other fatigue  -     Vitamin B12 & Folate; Future    10. FARZAD (generalized anxiety disorder)  Assessment & Plan:  Since pt has been on wellbutrin for just 1 week, rec she stay on same dose for at least another 4-6 weeks. If BP not elevated, and she still feels increase is needed, we can adjust at follow up      11. Elevated blood pressure reading  Comments:  may be due to wellbutrin, she was encouraged to check BP at home, notify office if const >135/85    12. Morbid obesity  Assessment & Plan:  Patient's (Body mass index is 52.61 kg/m².) indicates that they are morbidly/severely obese (BMI > 40 or > 35 with obesity - related health condition) with health conditions that include none . Weight is unchanged. BMI  is above average; BMI management plan is completed. We discussed portion control and increasing exercise.         Follow Up   Return in about 4 weeks (around 6/12/2023). Patient to notify office with any acute concerns or issues.  Patient verbalizes understanding, agrees with plan of care and has no further questions upon discharge.     Patient was given instructions and counseling regarding her condition or for health maintenance advice. Please see specific information pulled into the AVS if appropriate.     Discussed the importance of following up with any needed screening tests/labs/specialist appointments and any requested follow-up recommended by me today. Importance of maintaining follow-up discussed  and patient accepts that missed appointments can delay diagnosis and potentially lead to worsening of conditions.    Part of this note may be an electronic transcription/translation of spoken language to printed text using the Dragon Dictation System.

## 2023-05-16 LAB — HBA1C MFR BLD: 5.4 % (ref 4.8–5.6)

## 2023-05-17 LAB — BACTERIA SPEC AEROBE CULT: NORMAL

## 2023-05-18 DIAGNOSIS — E55.9 VITAMIN D DEFICIENCY: ICD-10-CM

## 2023-05-18 RX ORDER — ERGOCALCIFEROL 1.25 MG/1
50000 CAPSULE ORAL
Qty: 12 CAPSULE | Refills: 0 | Status: SHIPPED | OUTPATIENT
Start: 2023-05-18 | End: 2023-05-18 | Stop reason: SDUPTHER

## 2023-05-18 RX ORDER — ERGOCALCIFEROL 1.25 MG/1
50000 CAPSULE ORAL
Qty: 12 CAPSULE | Refills: 0 | Status: SHIPPED | OUTPATIENT
Start: 2023-05-18

## 2023-05-19 ENCOUNTER — OFFICE VISIT (OUTPATIENT)
Dept: FAMILY MEDICINE CLINIC | Age: 33
End: 2023-05-19
Payer: COMMERCIAL

## 2023-05-19 VITALS
DIASTOLIC BLOOD PRESSURE: 56 MMHG | TEMPERATURE: 98.4 F | WEIGHT: 293 LBS | OXYGEN SATURATION: 98 % | SYSTOLIC BLOOD PRESSURE: 109 MMHG | HEIGHT: 63 IN | HEART RATE: 64 BPM | BODY MASS INDEX: 51.91 KG/M2

## 2023-05-19 DIAGNOSIS — J06.9 VIRAL UPPER RESPIRATORY TRACT INFECTION: Primary | ICD-10-CM

## 2023-05-19 LAB
EXPIRATION DATE: NORMAL
FLUAV AG UPPER RESP QL IA.RAPID: NOT DETECTED
FLUBV AG UPPER RESP QL IA.RAPID: NOT DETECTED
INTERNAL CONTROL: NORMAL
Lab: NORMAL
SARS-COV-2 AG UPPER RESP QL IA.RAPID: NOT DETECTED

## 2023-05-19 PROCEDURE — 87428 SARSCOV & INF VIR A&B AG IA: CPT | Performed by: NURSE PRACTITIONER

## 2023-05-19 PROCEDURE — 1160F RVW MEDS BY RX/DR IN RCRD: CPT | Performed by: NURSE PRACTITIONER

## 2023-05-19 PROCEDURE — 99213 OFFICE O/P EST LOW 20 MIN: CPT | Performed by: NURSE PRACTITIONER

## 2023-05-19 PROCEDURE — 1159F MED LIST DOCD IN RCRD: CPT | Performed by: NURSE PRACTITIONER

## 2023-05-19 RX ORDER — METHYLPREDNISOLONE 4 MG/1
1 TABLET ORAL DAILY
Qty: 21 TABLET | Refills: 0 | Status: SHIPPED | OUTPATIENT
Start: 2023-05-19

## 2023-05-19 RX ORDER — BROMPHENIRAMINE MALEATE, PSEUDOEPHEDRINE HYDROCHLORIDE, AND DEXTROMETHORPHAN HYDROBROMIDE 2; 30; 10 MG/5ML; MG/5ML; MG/5ML
5 SYRUP ORAL 4 TIMES DAILY PRN
Qty: 118 ML | Refills: 0 | Status: SHIPPED | OUTPATIENT
Start: 2023-05-19 | End: 2023-05-26

## 2023-05-19 RX ORDER — ALBUTEROL SULFATE 90 UG/1
2 AEROSOL, METERED RESPIRATORY (INHALATION) EVERY 4 HOURS PRN
Qty: 18 G | Refills: 0 | Status: SHIPPED | OUTPATIENT
Start: 2023-05-19

## 2023-05-19 NOTE — PROGRESS NOTES
"Preston Walters presents to [facility] with complaint of  Cough (Sore throat, h/a, dizziness, congestion x 5 days )    SUBJECTIVE  Cough  This is a new problem. Episode onset: 5 days ago. The problem has been gradually worsening. The problem occurs every few minutes. The cough is non-productive. Associated symptoms include ear congestion, headaches, nasal congestion and postnasal drip. Pertinent negatives include no chest pain, fever, shortness of breath or wheezing. She has tried nothing for the symptoms.        OBJECTIVE  Vital Signs:   /56 (BP Location: Left arm, Patient Position: Sitting)   Pulse 64   Temp 98.4 °F (36.9 °C) (Oral)   Ht 160 cm (63\")   Wt 134 kg (295 lb 6.4 oz)   SpO2 98% Comment: room air  BMI 52.33 kg/m²       Physical Exam  Vitals reviewed.   Constitutional:       General: She is not in acute distress.     Appearance: Normal appearance. She is not ill-appearing.   HENT:      Head: Normocephalic and atraumatic.      Right Ear: Ear canal normal. A middle ear effusion is present.      Left Ear: Ear canal normal. A middle ear effusion is present.      Nose: Nose normal.      Mouth/Throat:      Mouth: Mucous membranes are moist.      Pharynx: Posterior oropharyngeal erythema present. No pharyngeal swelling.      Tonsils: No tonsillar exudate. 1+ on the right. 1+ on the left.   Cardiovascular:      Rate and Rhythm: Normal rate and regular rhythm.      Pulses: Normal pulses.      Heart sounds: Normal heart sounds.   Pulmonary:      Effort: Pulmonary effort is normal.      Breath sounds: Normal breath sounds.   Musculoskeletal:      Cervical back: Neck supple.   Skin:     General: Skin is warm and dry.   Neurological:      General: No focal deficit present.      Mental Status: She is alert and oriented to person, place, and time. Mental status is at baseline.   Psychiatric:         Mood and Affect: Mood normal.         Behavior: Behavior normal.         " Judgment: Judgment normal.          Results Review:  The following data was reviewed by Amanda Thurman, SASHA [unfilled] 13:48 EDT.    Office Visit on 05/19/2023   Component Date Value Ref Range Status   • SARS Antigen 05/19/2023 Not Detected  Not Detected, Presumptive Negative Final   • Influenza A Antigen CABRERA 05/19/2023 Not Detected  Not Detected Final   • Influenza B Antigen CABRERA 05/19/2023 Not Detected  Not Detected Final   • Internal Control 05/19/2023 Passed  Passed Final   • Lot Number 05/19/2023 [id number]   Final   • Expiration Date 05/19/2023 12/19/23   Final         ASSESSMENT AND PLAN:  Diagnoses and all orders for this visit:    1. Viral upper respiratory tract infection (Primary)  -     POCT SARS-CoV-2 Antigen CABRERA + Flu  -     brompheniramine-pseudoephedrine-DM 30-2-10 MG/5ML syrup; Take 5 mL by mouth 4 (Four) Times a Day As Needed for Congestion or Cough for up to 7 days.  Dispense: 118 mL; Refill: 0  -     methylPREDNISolone (MEDROL) 4 MG dose pack; Take 1 tablet by mouth Daily. Take as directed on package instructions.  Dispense: 21 tablet; Refill: 0  -     albuterol sulfate  (90 Base) MCG/ACT inhaler; Inhale 2 puffs Every 4 (Four) Hours As Needed for Wheezing.  Dispense: 18 g; Refill: 0      Patient was negative for strep at last visit, she was negative for flu and COVID today in office.  She was given Bromfed, Medrol pack, and albuterol inhaler to take as directed.  The patient was encouraged to increase rest and fluids. May take Tylenol for pain or fever. If symptoms persist 7 days or longer, antibiotic may be warranted and she was instructed to notify office.  Work note was provided.      Follow Up   Return if symptoms worsen or fail to improve, for Next scheduled follow up. Patient to notify office with any acute concerns or issues.  Patient verbalizes understanding, agrees with plan of care and has no further questions upon discharge.     Patient was given instructions and counseling regarding her  condition or for health maintenance advice. Please see specific information pulled into the AVS if appropriate.     Discussed the importance of following up with any needed screening tests/labs/specialist appointments and any requested follow-up recommended by me today. Importance of maintaining follow-up discussed and patient accepts that missed appointments can delay diagnosis and potentially lead to worsening of conditions.    Part of this note may be an electronic transcription/translation of spoken language to printed text using the Dragon Dictation System.

## 2023-05-23 ENCOUNTER — TELEPHONE (OUTPATIENT)
Dept: FAMILY MEDICINE CLINIC | Age: 33
End: 2023-05-23
Payer: COMMERCIAL

## 2023-05-23 DIAGNOSIS — E55.9 VITAMIN D DEFICIENCY: ICD-10-CM

## 2023-05-23 RX ORDER — ERGOCALCIFEROL 1.25 MG/1
50000 CAPSULE ORAL
Qty: 12 CAPSULE | Refills: 0 | Status: SHIPPED | OUTPATIENT
Start: 2023-05-23

## 2023-05-23 NOTE — TELEPHONE ENCOUNTER
Caller: Preston Walters    Relationship: Self    Best call back number: [phone]    What is the best time to reach you: ANYTIME     Who are you requesting to speak with (clinical staff, provider,  specific staff member): CLINICAL         What was the call regarding: PATIENT CALLING IN REGARDS TO A MEDICATION FOR VITAMIN D, TO BE SENT OVER TO [facility], PATIENT ADVISED PHARMACY HAS NOT RECEIVED A RX.     Do you require a callback: YES

## 2023-08-01 ENCOUNTER — TELEPHONE (OUTPATIENT)
Dept: FAMILY MEDICINE CLINIC | Age: 33
End: 2023-08-01
Payer: COMMERCIAL

## 2023-08-01 RX ORDER — SERTRALINE HYDROCHLORIDE 100 MG/1
100 TABLET, FILM COATED ORAL DAILY
OUTPATIENT
Start: 2023-08-01

## 2023-08-03 ENCOUNTER — LAB (OUTPATIENT)
Dept: LAB | Facility: HOSPITAL | Age: 33
End: 2023-08-03
Payer: COMMERCIAL

## 2023-08-03 ENCOUNTER — OFFICE VISIT (OUTPATIENT)
Dept: FAMILY MEDICINE CLINIC | Age: 33
End: 2023-08-03
Payer: COMMERCIAL

## 2023-08-03 VITALS
SYSTOLIC BLOOD PRESSURE: 114 MMHG | BODY MASS INDEX: 51.91 KG/M2 | HEIGHT: 63 IN | HEART RATE: 64 BPM | DIASTOLIC BLOOD PRESSURE: 75 MMHG | WEIGHT: 293 LBS

## 2023-08-03 DIAGNOSIS — E61.1 LOW IRON: ICD-10-CM

## 2023-08-03 DIAGNOSIS — E66.01 MORBID OBESITY: ICD-10-CM

## 2023-08-03 DIAGNOSIS — F41.1 GAD (GENERALIZED ANXIETY DISORDER): Primary | ICD-10-CM

## 2023-08-03 DIAGNOSIS — G89.29 CHRONIC PAIN OF RIGHT KNEE: ICD-10-CM

## 2023-08-03 DIAGNOSIS — E55.9 VITAMIN D DEFICIENCY: ICD-10-CM

## 2023-08-03 DIAGNOSIS — M25.561 CHRONIC PAIN OF RIGHT KNEE: ICD-10-CM

## 2023-08-03 LAB
25(OH)D3 SERPL-MCNC: 23.1 NG/ML (ref 30–100)
BASOPHILS # BLD AUTO: 0.03 10*3/MM3 (ref 0–0.2)
BASOPHILS NFR BLD AUTO: 0.4 % (ref 0–1.5)
DEPRECATED RDW RBC AUTO: 41.3 FL (ref 37–54)
EOSINOPHIL # BLD AUTO: 0.14 10*3/MM3 (ref 0–0.4)
EOSINOPHIL NFR BLD AUTO: 1.8 % (ref 0.3–6.2)
ERYTHROCYTE [DISTWIDTH] IN BLOOD BY AUTOMATED COUNT: 12.9 % (ref 12.3–15.4)
FERRITIN SERPL-MCNC: 79.6 NG/ML (ref 13–150)
HCT VFR BLD AUTO: 39.2 % (ref 34–46.6)
HGB BLD-MCNC: 13 G/DL (ref 12–15.9)
IMM GRANULOCYTES # BLD AUTO: 0.02 10*3/MM3 (ref 0–0.05)
IMM GRANULOCYTES NFR BLD AUTO: 0.3 % (ref 0–0.5)
IRON 24H UR-MRATE: 76 MCG/DL (ref 37–145)
IRON SATN MFR SERPL: 19 % (ref 20–50)
LYMPHOCYTES # BLD AUTO: 1.71 10*3/MM3 (ref 0.7–3.1)
LYMPHOCYTES NFR BLD AUTO: 21.5 % (ref 19.6–45.3)
MCH RBC QN AUTO: 29.1 PG (ref 26.6–33)
MCHC RBC AUTO-ENTMCNC: 33.2 G/DL (ref 31.5–35.7)
MCV RBC AUTO: 87.9 FL (ref 79–97)
MONOCYTES # BLD AUTO: 0.43 10*3/MM3 (ref 0.1–0.9)
MONOCYTES NFR BLD AUTO: 5.4 % (ref 5–12)
NEUTROPHILS NFR BLD AUTO: 5.62 10*3/MM3 (ref 1.7–7)
NEUTROPHILS NFR BLD AUTO: 70.6 % (ref 42.7–76)
NRBC BLD AUTO-RTO: 0 /100 WBC (ref 0–0.2)
PLATELET # BLD AUTO: 260 10*3/MM3 (ref 140–450)
PMV BLD AUTO: 12 FL (ref 6–12)
RBC # BLD AUTO: 4.46 10*6/MM3 (ref 3.77–5.28)
TIBC SERPL-MCNC: 395 MCG/DL (ref 298–536)
TRANSFERRIN SERPL-MCNC: 265 MG/DL (ref 200–360)
WBC NRBC COR # BLD: 7.95 10*3/MM3 (ref 3.4–10.8)

## 2023-08-03 PROCEDURE — 82306 VITAMIN D 25 HYDROXY: CPT

## 2023-08-03 PROCEDURE — 84466 ASSAY OF TRANSFERRIN: CPT

## 2023-08-03 PROCEDURE — 36415 COLL VENOUS BLD VENIPUNCTURE: CPT

## 2023-08-03 PROCEDURE — 83540 ASSAY OF IRON: CPT

## 2023-08-03 PROCEDURE — 85025 COMPLETE CBC W/AUTO DIFF WBC: CPT

## 2023-08-03 PROCEDURE — 82728 ASSAY OF FERRITIN: CPT

## 2023-08-03 RX ORDER — SERTRALINE HYDROCHLORIDE 100 MG/1
100 TABLET, FILM COATED ORAL DAILY
Qty: 90 TABLET | Refills: 1 | Status: SHIPPED | OUTPATIENT
Start: 2023-08-03

## 2023-08-07 DIAGNOSIS — E55.9 VITAMIN D DEFICIENCY: ICD-10-CM

## 2023-08-07 RX ORDER — ERGOCALCIFEROL 1.25 MG/1
50000 CAPSULE ORAL
Qty: 12 CAPSULE | Refills: 0 | Status: SHIPPED | OUTPATIENT
Start: 2023-08-07

## 2023-09-05 ENCOUNTER — PROCEDURE VISIT (OUTPATIENT)
Dept: FAMILY MEDICINE CLINIC | Age: 33
End: 2023-09-05
Payer: COMMERCIAL

## 2023-09-05 VITALS
TEMPERATURE: 97.9 F | HEART RATE: 60 BPM | BODY MASS INDEX: 51.91 KG/M2 | SYSTOLIC BLOOD PRESSURE: 111 MMHG | OXYGEN SATURATION: 100 % | DIASTOLIC BLOOD PRESSURE: 73 MMHG | WEIGHT: 293 LBS | HEIGHT: 63 IN

## 2023-09-05 DIAGNOSIS — G89.29 CHRONIC PAIN OF RIGHT KNEE: Primary | ICD-10-CM

## 2023-09-05 DIAGNOSIS — M25.561 CHRONIC PAIN OF RIGHT KNEE: Primary | ICD-10-CM

## 2023-09-05 DIAGNOSIS — M25.532 LEFT WRIST PAIN: ICD-10-CM

## 2023-09-05 RX ADMIN — LIDOCAINE HYDROCHLORIDE 3 ML: 10 INJECTION, SOLUTION INFILTRATION; PERINEURAL at 15:07

## 2023-09-05 RX ADMIN — LIDOCAINE HYDROCHLORIDE 1 ML: 10 INJECTION, SOLUTION INFILTRATION; PERINEURAL at 14:57

## 2023-09-05 RX ADMIN — TRIAMCINOLONE ACETONIDE 40 MG: 40 INJECTION, SUSPENSION INTRA-ARTICULAR; INTRAMUSCULAR at 15:07

## 2023-09-05 RX ADMIN — TRIAMCINOLONE ACETONIDE 40 MG: 40 INJECTION, SUSPENSION INTRA-ARTICULAR; INTRAMUSCULAR at 14:57

## 2023-09-07 RX ORDER — TRIAMCINOLONE ACETONIDE 40 MG/ML
40 INJECTION, SUSPENSION INTRA-ARTICULAR; INTRAMUSCULAR
Status: COMPLETED | OUTPATIENT
Start: 2023-09-05 | End: 2023-09-05

## 2023-09-07 RX ORDER — LIDOCAINE HYDROCHLORIDE 10 MG/ML
1 INJECTION, SOLUTION INFILTRATION; PERINEURAL
Status: COMPLETED | OUTPATIENT
Start: 2023-09-05 | End: 2023-09-05

## 2023-09-07 RX ORDER — LIDOCAINE HYDROCHLORIDE 10 MG/ML
3 INJECTION, SOLUTION INFILTRATION; PERINEURAL
Status: COMPLETED | OUTPATIENT
Start: 2023-09-05 | End: 2023-09-05

## 2023-09-07 NOTE — PROGRESS NOTES
"Chief Complaint  Procedure (LT wrist & right knee injection )    Subjective        Preston Walters presents to [facility]  Wrist Pain   The pain is present in the left wrist. This is a chronic problem. The quality of the pain is described as aching. Associated symptoms include numbness and tingling. The symptoms are aggravated by activity. She has tried acetaminophen, NSAIDS, heat and rest for the symptoms. The treatment provided mild relief.   Knee Pain   The pain is present in the right knee. The quality of the pain is described as aching. Associated symptoms include numbness and tingling. The symptoms are aggravated by weight bearing. She has tried NSAIDs, acetaminophen, ice and heat for the symptoms. The treatment provided mild relief.     Objective   Vital Signs:  /73 (BP Location: Left arm, Patient Position: Sitting, Cuff Size: Adult)   Pulse 60   Temp 97.9 °F (36.6 °C) (Oral)   Ht 160 cm (62.99\")   Wt 133 kg (294 lb)   SpO2 100%   BMI 52.10 kg/m²   Estimated body mass index is 52.1 kg/m² as calculated from the following:    Height as of this encounter: 160 cm (62.99\").    Weight as of this encounter: 133 kg (294 lb).               Physical Exam  Cardiovascular:      Rate and Rhythm: Normal rate.   Pulmonary:      Effort: Pulmonary effort is normal.   Musculoskeletal:      Left wrist: Crepitus present. Normal pulse.      Right knee: Swelling and crepitus present. No effusion or erythema. Normal pulse.   Skin:     General: Skin is warm and dry.   Neurological:      Mental Status: She is alert and oriented to person, place, and time.   Psychiatric:         Mood and Affect: Mood normal.      Result Review :            - Injection Tendon or Ligament    Date/Time: 9/5/2023 2:57 PM  Performed by: Luci Sanchez APRN  Authorized by: Luci Sanchez APRN   Preparation: Patient was prepped and draped in the usual sterile fashion.  Local anesthesia used: " no    Anesthesia:  Local anesthesia used: no    Sedation:  Patient sedated: no    Patient tolerance: patient tolerated the procedure well with no immediate complications  Comments: Discussed procedure, alternatives, possible complications and answered all questions. Written consent was obtained. Patient was in sitting position. Left wrist injection performed. No complications. Patient ambulatory at discharge.     Medications administered: 1 mL lidocaine 1 %; 40 mg triamcinolone acetonide 40 MG/ML      - Injection Tendon or Ligament    Date/Time: 9/5/2023 3:07 PM  Performed by: Luci Sanchez APRN  Authorized by: Luci Sanchez APRN   Preparation: Patient was prepped and draped in the usual sterile fashion.  Local anesthesia used: no    Anesthesia:  Local anesthesia used: no    Sedation:  Patient sedated: no    Patient tolerance: patient tolerated the procedure well with no immediate complications  Comments: Discussed procedure, alternatives, possible complications and answered all questions. Written consent was obtained. Patient was in sitting position. Right anterolateral knee injection performed. No complications. Patient ambulatory at discharge.     Medications administered: 3 mL lidocaine 1 %; 40 mg triamcinolone acetonide 40 MG/ML          Assessment and Plan   Diagnoses and all orders for this visit:    1. Chronic pain of right knee (Primary)  -     - Injection Tendon or Ligament    2. Left wrist pain  -     - Injection Tendon or Ligament             Follow Up   Return if symptoms worsen or fail to improve.  Patient was given instructions and counseling regarding her condition or for health maintenance advice. Please see specific information pulled into the AVS if appropriate.

## 2023-09-22 DIAGNOSIS — M25.561 RIGHT KNEE PAIN, UNSPECIFIED CHRONICITY: Primary | ICD-10-CM

## 2023-09-25 ENCOUNTER — OFFICE VISIT (OUTPATIENT)
Dept: FAMILY MEDICINE CLINIC | Age: 33
End: 2023-09-25

## 2023-09-25 ENCOUNTER — OFFICE VISIT (OUTPATIENT)
Dept: ORTHOPEDIC SURGERY | Facility: CLINIC | Age: 33
End: 2023-09-25

## 2023-09-25 ENCOUNTER — PATIENT ROUNDING (BHMG ONLY) (OUTPATIENT)
Dept: ORTHOPEDIC SURGERY | Facility: CLINIC | Age: 33
End: 2023-09-25

## 2023-09-25 ENCOUNTER — HOSPITAL ENCOUNTER (OUTPATIENT)
Dept: GENERAL RADIOLOGY | Facility: HOSPITAL | Age: 33
Discharge: HOME | End: 2023-09-25
Payer: COMMERCIAL

## 2023-09-25 VITALS
DIASTOLIC BLOOD PRESSURE: 80 MMHG | HEART RATE: 52 BPM | TEMPERATURE: 98.5 F | OXYGEN SATURATION: 97 % | WEIGHT: 292 LBS | HEIGHT: 63 IN | SYSTOLIC BLOOD PRESSURE: 120 MMHG | BODY MASS INDEX: 51.74 KG/M2

## 2023-09-25 VITALS — WEIGHT: 293 LBS | BODY MASS INDEX: 51.91 KG/M2 | HEIGHT: 63 IN

## 2023-09-25 DIAGNOSIS — R93.7 ABNORMAL X-RAY OF BONE: Primary | ICD-10-CM

## 2023-09-25 DIAGNOSIS — M25.561 RIGHT KNEE PAIN, UNSPECIFIED CHRONICITY: ICD-10-CM

## 2023-09-25 DIAGNOSIS — R05.1 ACUTE COUGH: ICD-10-CM

## 2023-09-25 DIAGNOSIS — J06.9 VIRAL URI WITH COUGH: Primary | ICD-10-CM

## 2023-09-25 DIAGNOSIS — J02.9 SORE THROAT: ICD-10-CM

## 2023-09-25 DIAGNOSIS — G89.29 CHRONIC PAIN OF RIGHT KNEE: ICD-10-CM

## 2023-09-25 DIAGNOSIS — M25.561 CHRONIC PAIN OF RIGHT KNEE: ICD-10-CM

## 2023-09-25 DIAGNOSIS — E66.01 MORBID OBESITY WITH BMI OF 50.0-59.9, ADULT: ICD-10-CM

## 2023-09-25 PROCEDURE — 87428 SARSCOV & INF VIR A&B AG IA: CPT | Performed by: NURSE PRACTITIONER

## 2023-09-25 PROCEDURE — 99204 OFFICE O/P NEW MOD 45 MIN: CPT | Performed by: PHYSICIAN ASSISTANT

## 2023-09-25 PROCEDURE — 71046 X-RAY EXAM CHEST 2 VIEWS: CPT

## 2023-09-25 PROCEDURE — 87081 CULTURE SCREEN ONLY: CPT | Performed by: NURSE PRACTITIONER

## 2023-09-25 PROCEDURE — 73560 X-RAY EXAM OF KNEE 1 OR 2: CPT

## 2023-09-25 PROCEDURE — 99214 OFFICE O/P EST MOD 30 MIN: CPT | Performed by: NURSE PRACTITIONER

## 2023-09-25 PROCEDURE — 87880 STREP A ASSAY W/OPTIC: CPT | Performed by: NURSE PRACTITIONER

## 2023-09-25 RX ORDER — ALBUTEROL SULFATE 90 UG/1
2 AEROSOL, METERED RESPIRATORY (INHALATION) EVERY 4 HOURS PRN
Qty: 18 G | Refills: 0 | Status: SHIPPED | OUTPATIENT
Start: 2023-09-25

## 2023-09-25 RX ORDER — BENZONATATE 100 MG/1
100 CAPSULE ORAL 3 TIMES DAILY PRN
Qty: 30 CAPSULE | Refills: 0 | Status: SHIPPED | OUTPATIENT
Start: 2023-09-25

## 2023-09-25 RX ORDER — IBUPROFEN 800 MG/1
800 TABLET ORAL EVERY 6 HOURS PRN
Qty: 40 TABLET | Refills: 0 | Status: SHIPPED | OUTPATIENT
Start: 2023-09-25

## 2023-09-25 RX ORDER — PREDNISONE 20 MG/1
TABLET ORAL
Qty: 19 TABLET | Refills: 0 | Status: SHIPPED | OUTPATIENT
Start: 2023-09-25 | End: 2023-10-04

## 2023-09-25 NOTE — PROGRESS NOTES
"Preston Walters presents to [facility] with complaint of  Cough and Sore Throat    SUBJECTIVE  URI   This is a new problem. Episode onset: 3 days. The maximum temperature recorded prior to her arrival was 101 - 101.9 F (last fever 2 days ago). Associated symptoms include congestion, coughing, diarrhea, headaches, rhinorrhea and a sore throat. Pertinent negatives include no ear pain, nausea or vomiting. She has tried acetaminophen and NSAIDs for the symptoms.     Patient is also wanting to discuss obesity.  She says she has been overweight most of her life.  She has tried lifestyle modifications and her weight does not ever change.  She says she also has a gym membership and goes to the gym a few times per week.  She is also busy at work as a .  She is asking about injectable therapy such as Mounjaro. She is also needing motrin refilled she takes for her knee. She also got knee injection and this did help somewhat.     OBJECTIVE  Vital Signs:   /80 (BP Location: Left arm, Patient Position: Sitting)   Pulse 52   Temp 98.5 °F (36.9 °C) (Oral)   Ht 160 cm (63\")   Wt 132 kg (292 lb)   SpO2 97% Comment: on room air  BMI 51.73 kg/m²       Physical Exam  Vitals reviewed.   Constitutional:       General: She is not in acute distress.     Appearance: Normal appearance. She is morbidly obese. She is not ill-appearing.   HENT:      Head: Normocephalic and atraumatic.      Right Ear: Tympanic membrane and ear canal normal.      Left Ear: Tympanic membrane and ear canal normal.      Nose: Nose normal.      Mouth/Throat:      Mouth: Mucous membranes are moist.      Pharynx: Posterior oropharyngeal erythema present. No pharyngeal swelling or oropharyngeal exudate.      Tonsils: No tonsillar exudate. 1+ on the right. 1+ on the left.   Cardiovascular:      Rate and Rhythm: Normal rate and regular rhythm.      Pulses: Normal pulses.      Heart sounds: Normal heart sounds. "   Pulmonary:      Effort: Pulmonary effort is normal.      Breath sounds: Normal breath sounds. Examination of the right-upper field reveals wheezing. Examination of the right-middle field reveals wheezing. Examination of the right-lower field reveals wheezing.   Musculoskeletal:      Cervical back: Neck supple.   Skin:     General: Skin is warm and dry.   Neurological:      General: No focal deficit present.      Mental Status: She is alert and oriented to person, place, and time. Mental status is at baseline.   Psychiatric:         Mood and Affect: Mood normal.         Behavior: Behavior normal.         Judgment: Judgment normal.        Results Review:  The following data was reviewed by Amanda Thurman, SASHA [unfilled] 10:20 EDT.    Office Visit on 09/25/2023   Component Date Value Ref Range Status    SARS Antigen 09/25/2023 Not Detected  Not Detected, Presumptive Negative Final    Influenza A Antigen CABRERA 09/25/2023 Not Detected  Not Detected Final    Influenza B Antigen CABRERA 09/25/2023 Not Detected  Not Detected Final    Internal Control 09/25/2023 Passed  Passed Final    Lot Number 09/25/2023 [id number]   Final    Expiration Date 09/25/2023 08/07/24   Final    Rapid Strep A Screen 09/25/2023 Negative  Negative, VALID, INVALID, Not Performed Final    Internal Control 09/25/2023 Passed  Passed Final    Lot Number 09/25/2023 [id number]   Final    Expiration Date 09/25/2023 12/31/24   Final         ASSESSMENT AND PLAN:  Diagnoses and all orders for this visit:    1. Viral URI with cough (Primary)  Comments:  CXR neg. she was encouraged to increase rest and fluids. May take Tylenol for pain or fever. If symptoms persist 10 days or longer, come back in to be seen.  Orders:  -     POCT SARS-CoV-2 Antigen CABRERA + Flu  -     XR Chest PA & Lateral; Future  -     predniSONE (DELTASONE) 20 MG tablet; Take 3 tablets by mouth Daily for 3 days, THEN 2 tablets Daily for 3 days, THEN 1 tablet Daily for 3 days, THEN 0.5 tablets Daily for 1 day.   Dispense: 19 tablet; Refill: 0  -     albuterol sulfate  (90 Base) MCG/ACT inhaler; Inhale 2 puffs Every 4 (Four) Hours As Needed for Wheezing.  Dispense: 18 g; Refill: 0  -     benzonatate (Tessalon Perles) 100 MG capsule; Take 1 capsule by mouth 3 (Three) Times a Day As Needed for Cough.  Dispense: 30 capsule; Refill: 0    2. Sore throat  -     POCT rapid strep A  -     Beta Strep Culture, Throat - , Throat; Future  -     Beta Strep Culture, Throat - Swab, Throat    3. Morbid obesity with BMI of 50.0-59.9, adult  Assessment & Plan:  Patient's (Body mass index is 51.73 kg/m².) indicates that they are morbidly/severely obese (BMI > 40 or > 35 with obesity - related health condition) with health conditions that include none . Weight is unchanged. BMI  is above average; BMI management plan is completed. We discussed portion control, increasing exercise, consulting a Bariatric surgeon, and discussed medications. Her insurance does not cover wegovy. She did not tolerate wellbutrin .     Orders:  -     Ambulatory Referral to Bariatric Surgery    4. Chronic pain of right knee  Comments:  seeing ortho today  Orders:  -     ibuprofen (ADVIL,MOTRIN) 800 MG tablet; Take 1 tablet by mouth Every 6 (Six) Hours As Needed for Moderate Pain (Knee pain).  Dispense: 40 tablet; Refill: 0          Follow Up   Return if symptoms worsen or fail to improve. Work note provided, return on Wednesday as long as fever free for 24 hours without antipyretic and as long as diarrhea has stopped. Patient to notify office with any acute concerns or issues.  Patient verbalizes understanding, agrees with plan of care and has no further questions upon discharge.     Patient was given instructions and counseling regarding her condition or for health maintenance advice. Please see specific information pulled into the AVS if appropriate.     Discussed the importance of following up with any needed screening tests/labs/specialist appointments and  any requested follow-up recommended by me today. Importance of maintaining follow-up discussed and patient accepts that missed appointments can delay diagnosis and potentially lead to worsening of conditions.    Part of this note may be an electronic transcription/translation of spoken language to printed text using the Dragon Dictation System.

## 2023-09-25 NOTE — PROGRESS NOTES
"Chief Complaint  Establish Care of the Right Knee    Subjective    History of Present Illness      Preston Walters is a 33 y.o. female who presents to [facility] for complaint of Knee Pain:   Patient complains of right knee pain.   The pain began several years ago.   The pain is located over anterior/patellar aspect.    Symptoms improve with medication: ibuprofen.   The symptoms are worse with stair climbing.      She reports a history of knee pain, ongoing for several years. She reports her knee \"crunches\" with flexion. She notes it is painful to climb stairs. She experiences pain underneath the knee cap and at the lateral knee. She states pain radiates up and down her leg-into the thigh and into the tibia. She reports she received an injection in her knee from SASHA Cruz several weeks ago and it only helped for about 2 days to a week at most. She denies undergoing any surgery in the past.     She reports she works as a . She is also a mother of 5 from ages 1-15.      Objective   Vital Signs:   Ht 160 cm (63\")   Wt 133 kg (293 lb 12.8 oz)   BMI 52.04 kg/m²       Physical Exam  Vitals signs and nursing note reviewed.   Constitutional:       Appearance: Normal appearance.   Pulmonary:      Effort: Pulmonary effort is normal.   Skin:     General: Skin is warm and dry.      Capillary Refill: Capillary refill takes less than 2 seconds.   Neurological:      General: No focal deficit present.      Mental Status: She is alert and oriented to person, place, and time. Mental status is at baseline.   Psychiatric:         Mood and Affect: Mood normal.         Behavior: Behavior normal.         Thought Content: Thought content normal.         Judgment: Judgment normal.     Ortho Exam   RIGHT knee  Positive patellar grind test with pain in the retropatellar region.    Positive for high Q-angle with patella tracking laterally in high grades of flexion.   Positive for tenderness over " the medial patellofemoral ligaments.   Quadriceps mechanism is well preserved.   Range of motion-Extension to Flexion: 0-115 degrees.  Negative anterior and posterior drawer. No medial or lateral instability noted.   Dorsalis pedis and posterior tibial artery pulses are palpable.   Common peroneal nerve function is well preserved.      Result Review :   Radiologic studies - see below for interpretation  RIGHT knee xrays  weightbearing/standing two views were ordered by Bravo Harley PA-C. Performed at [facility] on 09/25/2023. Images were independently viewed and interpreted by myself, my impression as follows:  Findings: Mild tricompartmental osteoarthritis.   There is subchondral sclerosis noted in all three compartments, I suspect there is worsening of articular cartilage damage than seen on xray  Bony lesion: no  Soft tissues: within normal limits  Joint spaces: within normal limits  Hardware appropriately positioned: not applicable  Prior studies available for comparison: no          PROCEDURE  Procedures           Assessment   Assessment and Plan    Diagnoses and all orders for this visit:    1. Abnormal x-ray of bone (Primary)  -     MRI Knee Right Without Contrast; Future             PLAN   Discussion of any imaging in detail. Discussion of orthopaedic goals.  Risk, benefits, and merits of treatment alternatives reviewed with the patient. Treatment alternatives include: oral anti-inflammatories/NSAID, physical therapy, and further imaging/testing. Discussed proceeding with MRI and possible treatment options depending on those results. We also discussed weight loss and she states she recently had a referral for bariatric surgery placed by her PCP.   Ice, heat, and/or modalities as beneficial  To schedule MRI of right knee to evaluate bone for chronic pain  Patient is encouraged to call or return for any issues or concerns.  Follow up will be based on when MRI has been performed. I  can message her via [employer].   Patient was given instructions and counseling regarding her condition or for health maintenance advice. Please see specific information pulled into the AVS if appropriate.     Bravo Harley PA-C   Date of Encounter: 9/25/2023     Transcribed from ambient dictation for Bravo Harley PA-C by Bravo Dupont.  09/25/23   14:35 EDT    Patient or patient representative verbalized consent to the visit recording.  I have personally performed the services described in this document as transcribed by the above individual, and it is both accurate and complete.  Bravo Harley PA-C  9/25/2023  15:36 EDT

## 2023-09-25 NOTE — PROGRESS NOTES
September 25, 2023      A ExSafe Message has been sent to the patient for PATIENT ROUNDING with [facility]

## 2023-09-25 NOTE — ASSESSMENT & PLAN NOTE
Patient's (Body mass index is 51.73 kg/m².) indicates that they are morbidly/severely obese (BMI > 40 or > 35 with obesity - related health condition) with health conditions that include none . Weight is unchanged. BMI  is above average; BMI management plan is completed. We discussed portion control, increasing exercise, consulting a Bariatric surgeon, and discussed medications. Her insurance does not cover wegovy. She did not tolerate wellbutrin .

## 2023-09-26 ENCOUNTER — TELEPHONE (OUTPATIENT)
Dept: FAMILY MEDICINE CLINIC | Age: 33
End: 2023-09-26
Payer: COMMERCIAL

## 2023-09-26 NOTE — TELEPHONE ENCOUNTER
Caller: Preston Walters    Relationship: Self    Best call back number: [phone]     What form or medical record are you requesting: WORK EXCUSE EXTENSION        How would you like to receive the form or medical records (pick-up, mail, fax):     Timeframe paperwork needed: AS SOON AS POSSIBLE    Additional notes: PATIENT REPORTS SEEN Amanda Thurman APRN 09/25 AND GOT AN EXCUSE THROUGH 09/27 - WILL NEED IT EXTENDED TO 09/28 TO RETURN TO WORK ON 9/29    PLEASE CALL PATIENT WHEN READY FOR

## 2023-09-27 LAB — BACTERIA SPEC AEROBE CULT: NORMAL

## 2023-09-28 ENCOUNTER — TELEPHONE (OUTPATIENT)
Dept: FAMILY MEDICINE CLINIC | Age: 33
End: 2023-09-28

## 2023-09-28 RX ORDER — AZITHROMYCIN 250 MG/1
TABLET, FILM COATED ORAL
Qty: 6 TABLET | Refills: 0 | Status: SHIPPED | OUTPATIENT
Start: 2023-09-28 | End: 2023-10-27

## 2023-09-28 RX ORDER — BROMPHENIRAMINE MALEATE, PSEUDOEPHEDRINE HYDROCHLORIDE, AND DEXTROMETHORPHAN HYDROBROMIDE 2; 30; 10 MG/5ML; MG/5ML; MG/5ML
5 SYRUP ORAL 4 TIMES DAILY PRN
Qty: 118 ML | Refills: 0 | Status: SHIPPED | OUTPATIENT
Start: 2023-09-28 | End: 2023-10-05

## 2023-09-28 NOTE — TELEPHONE ENCOUNTER
Caller: Preston Walters    Relationship to patient: Self    Best call back number: [phone]     Patient is needing: PATIENT WAS IN OFFICE 09.25.2023. PATIENT HAS TAKEN MEDICATION AS PRESCRIBED. PATIENT STILL EXPERIENCING SYMPTOMS OF COUGHING AND CHEST CONGESTION AND NOT GETTING ANY BETTER. PATIENT WOULD LIKE TO KNOW IF THERE IS SOMETHING ELSE SHE SHOULD DO OR IF THERE IS ANOTHER MEDICATION SHE SHOULD BE PRESCRIBED. PATIENT WOULD ALSO LIKE TO HAVE WORK EXCUSE EXTENDED AS WELL, SHE STILL HAS DIARRHEA AND CAN NOT GO TO WORK.  PLEASE ADVISE.

## 2023-10-19 ENCOUNTER — HOSPITAL ENCOUNTER (OUTPATIENT)
Dept: MRI IMAGING | Facility: HOSPITAL | Age: 33
Discharge: HOME | End: 2023-10-19
Admitting: PHYSICIAN ASSISTANT
Payer: COMMERCIAL

## 2023-10-19 DIAGNOSIS — R93.7 ABNORMAL X-RAY OF BONE: ICD-10-CM

## 2023-10-19 PROCEDURE — 73721 MRI JNT OF LWR EXTRE W/O DYE: CPT

## 2023-10-20 NOTE — PROGRESS NOTES
Preston,    I have reviewed your MRI results and it shows there is a tear of the meniscus in the lateral (outer) side of the knee. It also shows there is grade 2 arthritic change (grade 1 is the least and grade 4 is the worst) in the outside/lateral part of the knee cap. I feel like most of your symptoms are likely coming from the arthritis changes and I do not think I would recommend any type of surgery to start out. We could always try one round of steroid injection in the knee.   Let me know what you think.     Bravo

## 2023-10-27 ENCOUNTER — OFFICE VISIT (OUTPATIENT)
Dept: FAMILY MEDICINE CLINIC | Age: 33
End: 2023-10-27
Payer: COMMERCIAL

## 2023-10-27 ENCOUNTER — HOSPITAL ENCOUNTER (OUTPATIENT)
Dept: GENERAL RADIOLOGY | Facility: HOSPITAL | Age: 33
Discharge: HOME | End: 2023-10-27
Admitting: NURSE PRACTITIONER
Payer: COMMERCIAL

## 2023-10-27 VITALS
HEIGHT: 63 IN | DIASTOLIC BLOOD PRESSURE: 58 MMHG | BODY MASS INDEX: 51.84 KG/M2 | SYSTOLIC BLOOD PRESSURE: 108 MMHG | HEART RATE: 56 BPM | OXYGEN SATURATION: 97 % | WEIGHT: 292.6 LBS | TEMPERATURE: 98.1 F

## 2023-10-27 DIAGNOSIS — S96.912A MUSCLE STRAIN OF LEFT FOOT, INITIAL ENCOUNTER: Primary | ICD-10-CM

## 2023-10-27 DIAGNOSIS — M79.672 LEFT FOOT PAIN: ICD-10-CM

## 2023-10-27 PROCEDURE — 73630 X-RAY EXAM OF FOOT: CPT

## 2023-10-27 RX ORDER — IBUPROFEN 800 MG/1
800 TABLET ORAL EVERY 6 HOURS PRN
Qty: 40 TABLET | Refills: 0 | Status: SHIPPED | OUTPATIENT
Start: 2023-10-27

## 2023-10-27 NOTE — PROGRESS NOTES
Chief Complaint  Preston Walters presents to [facility] for Foot Pain (Left foot pain, on the bottom, hurts to walk, stumbled in a rock bed X 1 da )    Subjective          History of Present Illness    Preston is here today with c/o left foot pain. This started yesterday after stumbling in rock bed. She felt her foot shift. Pain is on the bottom of her foot. Some swelling yesterday. Denies pain in ankle.    Review of Systems      Allergies   Allergen Reactions    Pertussis Vaccines Anaphylaxis      Past Medical History:   Diagnosis Date    Abnormal O'Restrepo glucose challenge test, antepartum 12/26/2021    Anxiety     COVID-19 affecting pregnancy in first trimester 1/20/2022    Current weir pregnancy with history of congenital heart disease in prior child, antepartum 4/21/2020    Depression     Depression affecting pregnancy 12/23/2021    Diabetes mellitus     Gestational in the past. Not current.    Gestational diabetes mellitus 5/9/2022    History of difficult shoulder delivery, resulting in fractured clavicle of infant, currently pregnant 12/23/2021    History of gestational diabetes in prior pregnancy, currently pregnant 12/23/2021    Obesity     Pregnancy 10/18/2019    Rh negative state in antepartum period 11/23/2021     Current Outpatient Medications   Medication Sig Dispense Refill    albuterol sulfate  (90 Base) MCG/ACT inhaler Inhale 2 puffs Every 4 (Four) Hours As Needed for Wheezing. 18 g 0    benzonatate (Tessalon Perles) 100 MG capsule Take 1 capsule by mouth 3 (Three) Times a Day As Needed for Cough. 30 capsule 0    ferrous sulfate (FerrouSul) 325 (65 FE) MG tablet Take 1 tablet by mouth Daily With Breakfast. 90 tablet 0    ibuprofen (ADVIL,MOTRIN) 800 MG tablet Take 1 tablet by mouth Every 6 (Six) Hours As Needed for Moderate Pain (Foot pain). 40 tablet 0    sertraline (ZOLOFT) 100 MG tablet Take 1 tablet by mouth Daily. 90 tablet 1    vitamin D  "(ERGOCALCIFEROL) 1.25 MG (44067 UT) capsule capsule Take 1 capsule by mouth Every 7 (Seven) Days. 12 capsule 0     No current facility-administered medications for this visit.     History reviewed. No pertinent surgical history.   Social History     Tobacco Use    Smoking status: Never     Passive exposure: Past    Smokeless tobacco: Never   Vaping Use    Vaping Use: Never used   Substance Use Topics    Alcohol use: Not Currently    Drug use: Never     Family History   Problem Relation Age of Onset    Anxiety disorder Mother     COPD Mother     Depression Mother     Diabetes Mother     Hyperlipidemia Mother     Alcohol abuse Father     COPD Father     Diabetes Father     Hyperlipidemia Father      Health Maintenance Due   Topic Date Due    TDAP/TD VACCINES (1 - Tdap) Never done    ANNUAL PHYSICAL  Never done    PAP SMEAR  Never done      Immunization History   Administered Date(s) Administered    Influenza, Unspecified 10/27/2020    Rho (D) Immune Globulin 11/29/2021, 02/07/2022        Objective     Vitals:    10/27/23 1049   BP: 108/58   BP Location: Left arm   Patient Position: Sitting   Cuff Size: Large Adult   Pulse: 56   Temp: 98.1 °F (36.7 °C)   TempSrc: Oral   SpO2: 97%   Weight: 133 kg (292 lb 9.6 oz)   Height: 160 cm (63\")     Body mass index is 51.83 kg/m².             Physical Exam  Vitals reviewed.   Constitutional:       General: She is not in acute distress.     Appearance: Normal appearance. She is well-developed.   HENT:      Head: Normocephalic and atraumatic.   Cardiovascular:      Rate and Rhythm: Normal rate.   Pulmonary:      Effort: Pulmonary effort is normal.   Musculoskeletal:      Left ankle: No swelling. No tenderness.      Left foot: Tenderness present. No swelling. Normal pulse.   Neurological:      Mental Status: She is alert and oriented to person, place, and time.   Psychiatric:         Mood and Affect: Mood and affect normal.           Result Review :     The following data was " reviewed by: SASHA Booth on 10/27/2023:          XR Foot 3+ View Left (10/27/2023 11:23)                 Assessment and Plan      Diagnoses and all orders for this visit:    1. Muscle strain of left foot, initial encounter (Primary)  Comments:  Xray normal. Rest, elevate, NSAIDs, ice. Work note provided as she is a  and will need to rest foot.  Orders:  -     XR Foot 3+ View Left; Future  -     ibuprofen (ADVIL,MOTRIN) 800 MG tablet; Take 1 tablet by mouth Every 6 (Six) Hours As Needed for Moderate Pain (Foot pain).  Dispense: 40 tablet; Refill: 0              Follow Up     Return for As needed for persistent or worsening symptoms.

## 2023-10-27 NOTE — LETTER
October 27, 2023     Patient: Preston Walters   YOB: 1990   Date of Visit: 10/27/2023       To Whom It May Concern:    It is my medical opinion that Preston Walters  be excused from work Friday 10/27/23, Saturday 10/28/23, Jose R 10/29/23, and Monday 10/30/23. .           Sincerely,        SASHA Booth    CC: No Recipients

## 2023-11-01 ENCOUNTER — OFFICE VISIT (OUTPATIENT)
Dept: FAMILY MEDICINE CLINIC | Age: 33
End: 2023-11-01
Payer: COMMERCIAL

## 2023-11-01 VITALS
BODY MASS INDEX: 51.74 KG/M2 | WEIGHT: 292 LBS | HEART RATE: 56 BPM | HEIGHT: 63 IN | DIASTOLIC BLOOD PRESSURE: 84 MMHG | SYSTOLIC BLOOD PRESSURE: 128 MMHG

## 2023-11-01 DIAGNOSIS — M79.672 LEFT FOOT PAIN: Primary | ICD-10-CM

## 2023-11-01 PROCEDURE — 99213 OFFICE O/P EST LOW 20 MIN: CPT | Performed by: NURSE PRACTITIONER

## 2023-11-01 PROCEDURE — 1159F MED LIST DOCD IN RCRD: CPT | Performed by: NURSE PRACTITIONER

## 2023-11-01 PROCEDURE — 1160F RVW MEDS BY RX/DR IN RCRD: CPT | Performed by: NURSE PRACTITIONER

## 2023-11-01 RX ORDER — MELOXICAM 7.5 MG/1
7.5 TABLET ORAL DAILY
Qty: 30 TABLET | Refills: 0 | Status: SHIPPED | OUTPATIENT
Start: 2023-11-01

## 2023-11-01 NOTE — PROGRESS NOTES
"Chief Complaint  Foot Pain ((L) )    Subjective          Preston Walters presents to [facility]  History of Present Illness  She was seen last week by Letty Nguyễn for left foot pain.  It was recommended to take ibuprofen and RICE.  X-ray of her left foot did not show anything concerning. She reports that it's worsening and that there are sharp pains going in the outer part and ball of her left foot that will shoot up her calf. She reports that she has been icing and keeping it elevated.       Objective   Vital Signs:   /84 (BP Location: Right arm, Patient Position: Sitting)   Pulse 56   Ht 160 cm (63\")   Wt 132 kg (292 lb)   BMI 51.73 kg/m²     Physical Exam  Constitutional:       General: She is not in acute distress.     Appearance: Normal appearance.   HENT:      Head: Normocephalic.   Eyes:      Pupils: Pupils are equal, round, and reactive to light.      Visual Fields: Right eye visual fields normal and left eye visual fields normal.   Neck:      Trachea: Trachea normal.   Cardiovascular:      Rate and Rhythm: Normal rate and regular rhythm.      Heart sounds: Normal heart sounds.   Pulmonary:      Effort: Pulmonary effort is normal.      Breath sounds: Normal breath sounds and air entry.   Musculoskeletal:      Right lower leg: No edema.      Left lower leg: No edema.   Skin:     General: Skin is warm and dry.   Neurological:      Mental Status: She is alert and oriented to person, place, and time.   Psychiatric:         Mood and Affect: Mood and affect normal.         Behavior: Behavior normal.         Thought Content: Thought content normal.        Result Review :   The following data was reviewed by: SASHA Lynch on 11/01/2023:                  Assessment and Plan    Diagnoses and all orders for this visit:    1. Left foot pain (Primary)  -     meloxicam (Mobic) 7.5 MG tablet; Take 1 tablet by mouth Daily.  Dispense: 30 tablet; Refill: 0  -     Cancel: " Ambulatory Referral to Podiatry  -     Ambulatory Referral to Podiatry    We will switch her ibuprofen to meloxicam.  Will also refer her to podiatry.      Follow Up   Return if symptoms worsen or fail to improve.  Patient was given instructions and counseling regarding her condition or for health maintenance advice. Please see specific information pulled into the AVS if appropriate.

## 2023-11-08 ENCOUNTER — TELEPHONE (OUTPATIENT)
Dept: FAMILY MEDICINE CLINIC | Age: 33
End: 2023-11-08
Payer: COMMERCIAL

## 2023-11-08 NOTE — TELEPHONE ENCOUNTER
Pt calling to state she was to return to work last week, but was out of town. Today is her first day back. Her work is requesting a work note stating she is cleared without restrictions or if she has restrictions those need to be added to her note.     LV with LH on 11/1/23  Foot Xray by AC on 10/27/23.     Please adv.

## 2023-12-07 ENCOUNTER — TELEPHONE (OUTPATIENT)
Dept: FAMILY MEDICINE CLINIC | Age: 33
End: 2023-12-07
Payer: COMMERCIAL

## 2023-12-11 ENCOUNTER — LAB (OUTPATIENT)
Dept: LAB | Facility: HOSPITAL | Age: 33
End: 2023-12-11
Payer: COMMERCIAL

## 2023-12-11 ENCOUNTER — PROCEDURE VISIT (OUTPATIENT)
Dept: FAMILY MEDICINE CLINIC | Age: 33
End: 2023-12-11
Payer: COMMERCIAL

## 2023-12-11 VITALS
BODY MASS INDEX: 51.91 KG/M2 | HEIGHT: 63 IN | TEMPERATURE: 97.7 F | DIASTOLIC BLOOD PRESSURE: 78 MMHG | WEIGHT: 293 LBS | HEART RATE: 60 BPM | SYSTOLIC BLOOD PRESSURE: 135 MMHG | OXYGEN SATURATION: 100 %

## 2023-12-11 DIAGNOSIS — G56.03 BILATERAL CARPAL TUNNEL SYNDROME: Primary | ICD-10-CM

## 2023-12-11 DIAGNOSIS — E55.9 VITAMIN D DEFICIENCY: ICD-10-CM

## 2023-12-11 PROCEDURE — 82306 VITAMIN D 25 HYDROXY: CPT

## 2023-12-11 PROCEDURE — 36415 COLL VENOUS BLD VENIPUNCTURE: CPT

## 2023-12-11 RX ORDER — TRIAMCINOLONE ACETONIDE 40 MG/ML
40 INJECTION, SUSPENSION INTRA-ARTICULAR; INTRAMUSCULAR
Status: COMPLETED | OUTPATIENT
Start: 2023-12-11 | End: 2023-12-11

## 2023-12-11 RX ORDER — LIDOCAINE HYDROCHLORIDE 10 MG/ML
1 INJECTION, SOLUTION INFILTRATION; PERINEURAL
Status: COMPLETED | OUTPATIENT
Start: 2023-12-11 | End: 2023-12-11

## 2023-12-11 RX ADMIN — TRIAMCINOLONE ACETONIDE 40 MG: 40 INJECTION, SUSPENSION INTRA-ARTICULAR; INTRAMUSCULAR at 12:27

## 2023-12-11 RX ADMIN — TRIAMCINOLONE ACETONIDE 40 MG: 40 INJECTION, SUSPENSION INTRA-ARTICULAR; INTRAMUSCULAR at 12:33

## 2023-12-11 RX ADMIN — LIDOCAINE HYDROCHLORIDE 1 ML: 10 INJECTION, SOLUTION INFILTRATION; PERINEURAL at 12:33

## 2023-12-11 RX ADMIN — LIDOCAINE HYDROCHLORIDE 1 ML: 10 INJECTION, SOLUTION INFILTRATION; PERINEURAL at 12:27

## 2023-12-11 NOTE — PROGRESS NOTES
"Chief Complaint  Procedure (Bilateral wrist injection)    Subjective        Preston Walters presents to [facility]  Wrist Pain   The pain is present in the left wrist, right wrist and right arm. This is a chronic problem. The problem has been waxing and waning. The quality of the pain is described as aching. Associated symptoms include stiffness. She has tried NSAIDS and rest for the symptoms. The treatment provided no relief.       Objective   Vital Signs:  /78 (BP Location: Left arm, Patient Position: Sitting, Cuff Size: Large Adult)   Pulse 60   Temp 97.7 °F (36.5 °C) (Oral)   Ht 160 cm (63\")   Wt 134 kg (295 lb 3.2 oz)   SpO2 100% Comment: room air  BMI 52.29 kg/m²   Estimated body mass index is 52.29 kg/m² as calculated from the following:    Height as of this encounter: 160 cm (63\").    Weight as of this encounter: 134 kg (295 lb 3.2 oz).               Physical Exam  Cardiovascular:      Rate and Rhythm: Normal rate.   Pulmonary:      Effort: Pulmonary effort is normal.   Musculoskeletal:      Right wrist: Crepitus present. Normal pulse.      Left wrist: Crepitus present. Normal pulse.   Skin:     General: Skin is warm and dry.   Neurological:      Mental Status: She is alert and oriented to person, place, and time.   Psychiatric:         Mood and Affect: Mood normal.        Result Review :            - Injection Tendon or Ligament    Date/Time: 12/11/2023 12:27 PM    Performed by: Luci Sanchez APRN  Authorized by: Luci Sanchez APRN  Preparation: Patient was prepped and draped in the usual sterile fashion.  Local anesthesia used: no    Anesthesia:  Local anesthesia used: no    Sedation:  Patient sedated: no    Patient tolerance: patient tolerated the procedure well with no immediate complications  Comments: Discussed procedure, alternatives, possible complications and answered all questions. Written consent was obtained. Patient was in sitting position. Right " carpel tunnel wrist injection performed. No complications. Patient ambulatory at discharge.     Medications administered: 1 mL lidocaine 1 %; 40 mg triamcinolone acetonide 40 MG/ML      - Injection Tendon or Ligament    Date/Time: 12/11/2023 12:33 PM    Performed by: Luci Sanchez APRN  Authorized by: Luci Sanchez APRN  Preparation: Patient was prepped and draped in the usual sterile fashion.  Local anesthesia used: no    Anesthesia:  Local anesthesia used: no    Sedation:  Patient sedated: no    Patient tolerance: patient tolerated the procedure well with no immediate complications  Comments: Discussed procedure, alternatives, possible complications and answered all questions. Written consent was obtained. Patient was in sitting position. Left carpel tunnel wrist injection performed. No complications. Patient ambulatory at discharge.    Medications administered: 1 mL lidocaine 1 %; 40 mg triamcinolone acetonide 40 MG/ML            Assessment and Plan   Diagnoses and all orders for this visit:    1. Bilateral carpal tunnel syndrome (Primary)  -     - Injection Tendon or Ligament  -     - Injection Tendon or Ligament             Follow Up   Return if symptoms worsen or fail to improve.  Patient was given instructions and counseling regarding her condition or for health maintenance advice. Please see specific information pulled into the AVS if appropriate.

## 2023-12-12 LAB — 25(OH)D3 SERPL-MCNC: 26 NG/ML (ref 30–100)

## 2023-12-14 DIAGNOSIS — Z86.2 HISTORY OF IRON DEFICIENCY ANEMIA: Primary | ICD-10-CM

## 2023-12-14 DIAGNOSIS — E55.9 VITAMIN D DEFICIENCY: ICD-10-CM

## 2023-12-14 RX ORDER — ERGOCALCIFEROL 1.25 MG/1
50000 CAPSULE ORAL
Qty: 4 CAPSULE | Refills: 3 | Status: SHIPPED | OUTPATIENT
Start: 2023-12-14

## 2024-02-06 ENCOUNTER — LAB (OUTPATIENT)
Dept: LAB | Facility: HOSPITAL | Age: 34
End: 2024-02-06
Payer: COMMERCIAL

## 2024-02-06 ENCOUNTER — OFFICE VISIT (OUTPATIENT)
Dept: FAMILY MEDICINE CLINIC | Age: 34
End: 2024-02-06
Payer: COMMERCIAL

## 2024-02-06 VITALS
HEIGHT: 63 IN | SYSTOLIC BLOOD PRESSURE: 126 MMHG | BODY MASS INDEX: 51.91 KG/M2 | DIASTOLIC BLOOD PRESSURE: 63 MMHG | HEART RATE: 64 BPM | WEIGHT: 293 LBS

## 2024-02-06 DIAGNOSIS — F41.1 GAD (GENERALIZED ANXIETY DISORDER): Primary | ICD-10-CM

## 2024-02-06 DIAGNOSIS — R21 RASH AND NONSPECIFIC SKIN ERUPTION: ICD-10-CM

## 2024-02-06 DIAGNOSIS — E55.9 VITAMIN D DEFICIENCY: ICD-10-CM

## 2024-02-06 DIAGNOSIS — Z86.2 HISTORY OF IRON DEFICIENCY ANEMIA: ICD-10-CM

## 2024-02-06 LAB
BASOPHILS # BLD AUTO: 0.03 10*3/MM3 (ref 0–0.2)
BASOPHILS NFR BLD AUTO: 0.3 % (ref 0–1.5)
DEPRECATED RDW RBC AUTO: 45.4 FL (ref 37–54)
EOSINOPHIL # BLD AUTO: 0.25 10*3/MM3 (ref 0–0.4)
EOSINOPHIL NFR BLD AUTO: 2.5 % (ref 0.3–6.2)
ERYTHROCYTE [DISTWIDTH] IN BLOOD BY AUTOMATED COUNT: 13.6 % (ref 12.3–15.4)
FERRITIN SERPL-MCNC: 66.1 NG/ML (ref 13–150)
HCT VFR BLD AUTO: 38.6 % (ref 34–46.6)
HGB BLD-MCNC: 12.1 G/DL (ref 12–15.9)
IMM GRANULOCYTES # BLD AUTO: 0.01 10*3/MM3 (ref 0–0.05)
IMM GRANULOCYTES NFR BLD AUTO: 0.1 % (ref 0–0.5)
IRON 24H UR-MRATE: 85 MCG/DL (ref 37–145)
IRON SATN MFR SERPL: 19 % (ref 20–50)
LYMPHOCYTES # BLD AUTO: 2.35 10*3/MM3 (ref 0.7–3.1)
LYMPHOCYTES NFR BLD AUTO: 23 % (ref 19.6–45.3)
MCH RBC QN AUTO: 28.3 PG (ref 26.6–33)
MCHC RBC AUTO-ENTMCNC: 31.3 G/DL (ref 31.5–35.7)
MCV RBC AUTO: 90.2 FL (ref 79–97)
MONOCYTES # BLD AUTO: 0.63 10*3/MM3 (ref 0.1–0.9)
MONOCYTES NFR BLD AUTO: 6.2 % (ref 5–12)
NEUTROPHILS NFR BLD AUTO: 6.93 10*3/MM3 (ref 1.7–7)
NEUTROPHILS NFR BLD AUTO: 67.9 % (ref 42.7–76)
PLATELET # BLD AUTO: 251 10*3/MM3 (ref 140–450)
PMV BLD AUTO: 10.7 FL (ref 6–12)
RBC # BLD AUTO: 4.28 10*6/MM3 (ref 3.77–5.28)
TIBC SERPL-MCNC: 450 MCG/DL (ref 298–536)
TRANSFERRIN SERPL-MCNC: 302 MG/DL (ref 200–360)
WBC NRBC COR # BLD AUTO: 10.2 10*3/MM3 (ref 3.4–10.8)

## 2024-02-06 PROCEDURE — 82728 ASSAY OF FERRITIN: CPT

## 2024-02-06 PROCEDURE — 99214 OFFICE O/P EST MOD 30 MIN: CPT | Performed by: NURSE PRACTITIONER

## 2024-02-06 PROCEDURE — 85025 COMPLETE CBC W/AUTO DIFF WBC: CPT

## 2024-02-06 PROCEDURE — 86618 LYME DISEASE ANTIBODY: CPT

## 2024-02-06 PROCEDURE — 84466 ASSAY OF TRANSFERRIN: CPT

## 2024-02-06 PROCEDURE — 36415 COLL VENOUS BLD VENIPUNCTURE: CPT

## 2024-02-06 PROCEDURE — 83540 ASSAY OF IRON: CPT

## 2024-02-06 RX ORDER — TRIAMCINOLONE ACETONIDE 1 MG/G
1 OINTMENT TOPICAL 2 TIMES DAILY
Qty: 80 G | Refills: 1 | Status: SHIPPED | OUTPATIENT
Start: 2024-02-06

## 2024-02-06 RX ORDER — HYDROXYZINE HYDROCHLORIDE 25 MG/1
25 TABLET, FILM COATED ORAL 3 TIMES DAILY PRN
Qty: 30 TABLET | Refills: 0 | Status: SHIPPED | OUTPATIENT
Start: 2024-02-06

## 2024-02-06 RX ORDER — SERTRALINE HYDROCHLORIDE 100 MG/1
100 TABLET, FILM COATED ORAL DAILY
Qty: 90 TABLET | Refills: 1 | Status: SHIPPED | OUTPATIENT
Start: 2024-02-06

## 2024-02-06 NOTE — PROGRESS NOTES
"Preston Walters presents to [facility] with complaint of  Anxiety (6 month follow up ) and Rash (Stomach and chest about a month )    SUBJECTIVE  History of Present Illness    Patient is here for anxiety follow-up.  She has switched back completely to Zoloft at the 6 months ago.  She is on 100 mg daily.  She says this medication is working well for her compared to Wellbutrin.  She is needing medication refilled.  Patient is also due for iron level and CBC check.  She is on iron supplement currently.  She is on vitamin D 50,000 units weekly as well.  Due for vitamin D check in 1 month.    Patient also has a rash that has been present on her upper abdomen for 1 month.  Rash is described as red splotches and is very itchy.  She was seen at urgent care, given steroid injection which she says helped somewhat.  Rash has never resolved completely however.  She did have flu and COVID over the past few months and is thinking maybe rash is related to COVID.  She does mention that she had a tick bite on her back 1 week prior to rash appearing.  She denies any fever or chills headache shortness of air worsened joint pain.  She has tried multiple over-the-counter lotions and creams without any improvement of her rash.    OBJECTIVE  Vital Signs:   /63 (BP Location: Right arm, Patient Position: Sitting)   Pulse 64   Ht 160 cm (63\")   Wt 135 kg (297 lb)   BMI 52.61 kg/m²       Physical Exam  Vitals reviewed.   Constitutional:       General: She is not in acute distress.     Appearance: Normal appearance. She is not ill-appearing.   HENT:      Head: Normocephalic and atraumatic.      Nose: Nose normal.      Mouth/Throat:      Mouth: Mucous membranes are moist.      Pharynx: Oropharynx is clear.   Cardiovascular:      Rate and Rhythm: Normal rate and regular rhythm.      Pulses: Normal pulses.      Heart sounds: Normal heart sounds.   Pulmonary:      Effort: Pulmonary effort is normal.      " Breath sounds: Normal breath sounds.   Musculoskeletal:      Cervical back: Neck supple.   Skin:     General: Skin is warm and dry.      Findings: Erythema and rash (refer to photo) present.   Neurological:      General: No focal deficit present.      Mental Status: She is alert and oriented to person, place, and time. Mental status is at baseline.   Psychiatric:         Mood and Affect: Mood normal.         Behavior: Behavior normal.         Judgment: Judgment normal.             F        ASSESSMENT AND PLAN:  Diagnoses and all orders for this visit:    1. FARZAD (generalized anxiety disorder) (Primary)  -     sertraline (ZOLOFT) 100 MG tablet; Take 1 tablet by mouth Daily.  Dispense: 90 tablet; Refill: 1    2. Vitamin D deficiency  -     Cancel: Vitamin D 25 hydroxy; Future    3. Rash and nonspecific skin eruption  -     Lyme Disease Total Antibody With Reflex to Immunoassay; Future  -     triamcinolone (KENALOG) 0.1 % ointment; Apply 1 Application topically to the appropriate area as directed 2 (Two) Times a Day.  Dispense: 80 g; Refill: 1  -     hydrOXYzine (ATARAX) 25 MG tablet; Take 1 tablet by mouth 3 (Three) Times a Day As Needed for Itching.  Dispense: 30 tablet; Refill: 0      Anxiety is well-controlled, refilled Zoloft.  Since she has been having good response with Zoloft, she may start doing yearly visits, with following up as needed.    Ruling out Lyme infection given recent tick bite and faint rash on her abdomen.  Also discussed possibility that this could be related to recent COVID infection.  She was given Kenalog ointment to apply to rash as well as hydroxyzine to take as needed for itching.  If Lyme comes back negative, and she does not see improvement with Kenalog and hydroxyzine, she would likely be referred to dermatology.      Follow Up   Return in about 1 year (around 2/6/2025), or if rash worsens or fail to improve. Patient to notify office with any acute concerns or issues.  Patient verbalizes  understanding, agrees with plan of care and has no further questions upon discharge.     Patient was given instructions and counseling regarding her condition or for health maintenance advice. Please see specific information pulled into the AVS if appropriate.     Discussed the importance of following up with any needed screening tests/labs/specialist appointments and any requested follow-up recommended by me today. Importance of maintaining follow-up discussed and patient accepts that missed appointments can delay diagnosis and potentially lead to worsening of conditions.    Part of this note may be an electronic transcription/translation of spoken language to printed text using the Dragon Dictation System.

## 2024-02-07 ENCOUNTER — PATIENT MESSAGE (OUTPATIENT)
Dept: FAMILY MEDICINE CLINIC | Age: 34
End: 2024-02-07
Payer: COMMERCIAL

## 2024-02-07 DIAGNOSIS — R21 RASH AND NONSPECIFIC SKIN ERUPTION: Primary | ICD-10-CM

## 2024-02-08 DIAGNOSIS — R21 RASH AND NONSPECIFIC SKIN ERUPTION: Primary | ICD-10-CM

## 2024-02-08 LAB — B BURGDOR IGG+IGM SER QL IA: NEGATIVE

## 2024-02-08 RX ORDER — TRIAMCINOLONE ACETONIDE 40 MG/ML
60 INJECTION, SUSPENSION INTRA-ARTICULAR; INTRAMUSCULAR ONCE
Status: COMPLETED | OUTPATIENT
Start: 2024-02-09 | End: 2024-02-09

## 2024-02-08 NOTE — TELEPHONE ENCOUNTER
From: Preston Walters  To: Amanda Thurman  Sent: 2/7/2024 4:52 PM EST  Subject: Rash    I’m including some pictures of how my rash was today and if it looks like anything in particular. It’s itches terribly.

## 2024-02-09 ENCOUNTER — CLINICAL SUPPORT (OUTPATIENT)
Dept: FAMILY MEDICINE CLINIC | Age: 34
End: 2024-02-09
Payer: COMMERCIAL

## 2024-02-09 DIAGNOSIS — R21 RASH AND NONSPECIFIC SKIN ERUPTION: Primary | ICD-10-CM

## 2024-02-09 PROCEDURE — 96372 THER/PROPH/DIAG INJ SC/IM: CPT | Performed by: NURSE PRACTITIONER

## 2024-02-09 RX ADMIN — TRIAMCINOLONE ACETONIDE 60 MG: 40 INJECTION, SUSPENSION INTRA-ARTICULAR; INTRAMUSCULAR at 16:21

## 2024-04-07 DIAGNOSIS — E55.9 VITAMIN D DEFICIENCY: ICD-10-CM

## 2024-04-08 RX ORDER — ERGOCALCIFEROL 1.25 MG/1
50000 CAPSULE ORAL
Qty: 4 CAPSULE | Refills: 3 | Status: SHIPPED | OUTPATIENT
Start: 2024-04-08

## 2024-04-08 NOTE — TELEPHONE ENCOUNTER
Rx Refill Note  Requested Prescriptions     Pending Prescriptions Disp Refills    vitamin D (ERGOCALCIFEROL) 1.25 MG (85169 UT) capsule capsule [Pharmacy Med Name: ergocalciferol (vitamin D2) 1,250 mcg (50,000 unit) capsule] 4 capsule 3     Sig: TAKE ONE CAPSULE BY MOUTH EVERY 7 DAYS      Last office visit with prescribing clinician: 2/6/2024     Next office visit with prescribing clinician: Visit date not found  Vitamin D 25 hydroxy (12/11/2023 12:20)       Mindy Avila LPN  04/08/24, 10:24 EDT

## 2024-05-01 ENCOUNTER — PROCEDURE VISIT (OUTPATIENT)
Dept: FAMILY MEDICINE CLINIC | Age: 34
End: 2024-05-01
Payer: COMMERCIAL

## 2024-05-01 VITALS
SYSTOLIC BLOOD PRESSURE: 127 MMHG | HEART RATE: 55 BPM | DIASTOLIC BLOOD PRESSURE: 67 MMHG | WEIGHT: 289 LBS | OXYGEN SATURATION: 100 % | BODY MASS INDEX: 51.21 KG/M2 | HEIGHT: 63 IN

## 2024-05-01 DIAGNOSIS — F41.1 GAD (GENERALIZED ANXIETY DISORDER): ICD-10-CM

## 2024-05-01 RX ORDER — TRIAMCINOLONE ACETONIDE 40 MG/ML
40 INJECTION, SUSPENSION INTRA-ARTICULAR; INTRAMUSCULAR
Status: COMPLETED | OUTPATIENT
Start: 2024-05-01 | End: 2024-05-01

## 2024-05-01 RX ORDER — ONDANSETRON 8 MG/1
TABLET, ORALLY DISINTEGRATING ORAL
COMMUNITY
Start: 2024-02-23

## 2024-05-01 RX ORDER — LIDOCAINE HYDROCHLORIDE 10 MG/ML
1 INJECTION, SOLUTION INFILTRATION; PERINEURAL
Status: COMPLETED | OUTPATIENT
Start: 2024-05-01 | End: 2024-05-01

## 2024-05-01 RX ADMIN — LIDOCAINE HYDROCHLORIDE 1 ML: 10 INJECTION, SOLUTION INFILTRATION; PERINEURAL at 16:37

## 2024-05-01 RX ADMIN — LIDOCAINE HYDROCHLORIDE 1 ML: 10 INJECTION, SOLUTION INFILTRATION; PERINEURAL at 16:43

## 2024-05-01 RX ADMIN — TRIAMCINOLONE ACETONIDE 40 MG: 40 INJECTION, SUSPENSION INTRA-ARTICULAR; INTRAMUSCULAR at 16:37

## 2024-05-01 RX ADMIN — TRIAMCINOLONE ACETONIDE 40 MG: 40 INJECTION, SUSPENSION INTRA-ARTICULAR; INTRAMUSCULAR at 16:43

## 2024-05-01 NOTE — PROGRESS NOTES
"Chief Complaint  Procedure (Both wrist. /Carpal tunnel injection. )    Subjective        Preston Walters presents to [facility]  Wrist Pain   The pain is present in the left wrist and right wrist. This is a recurrent problem. The current episode started in the past 7 days. The problem has been gradually worsening. The quality of the pain is described as aching. Associated symptoms include numbness, stiffness and tingling. The symptoms are aggravated by cold. She has tried acetaminophen, NSAIDS and rest for the symptoms. The treatment provided no relief.       Objective   Vital Signs:  /67 (BP Location: Right arm, Patient Position: Sitting)   Pulse 55   Ht 160 cm (62.99\")   Wt 131 kg (289 lb)   SpO2 100%   BMI 51.21 kg/m²   Estimated body mass index is 51.21 kg/m² as calculated from the following:    Height as of this encounter: 160 cm (62.99\").    Weight as of this encounter: 131 kg (289 lb).               Physical Exam  Cardiovascular:      Rate and Rhythm: Normal rate.   Pulmonary:      Effort: Pulmonary effort is normal.   Musculoskeletal:      Right wrist: Normal. No swelling or crepitus. Normal pulse.      Left wrist: Normal. No swelling or crepitus. Normal pulse.   Skin:     General: Skin is warm and dry.   Neurological:      Mental Status: She is alert and oriented to person, place, and time.   Psychiatric:         Mood and Affect: Mood normal.        Result Review :              - Injection Tendon or Ligament    Date/Time: 5/1/2024 4:37 PM    Performed by: Luci Sanchez APRN  Authorized by: Luci Sanchez APRN  Preparation: Patient was prepped and draped in the usual sterile fashion.  Local anesthesia used: no    Anesthesia:  Local anesthesia used: no    Sedation:  Patient sedated: no    Patient tolerance: patient tolerated the procedure well with no immediate complications  Comments: Discussed procedure, alternatives, possible complications and answered all " questions. Written consent was obtained. Patient was in sitting position. Left wrist injection performed. No complications. Patient ambulatory at discharge.     Medications administered: 1 mL lidocaine 1 %; 40 mg triamcinolone acetonide 40 MG/ML      - Injection Tendon or Ligament    Date/Time: 5/1/2024 4:43 PM    Performed by: Luci Sanchez APRN  Authorized by: Luci Sanchez APRN  Preparation: Patient was prepped and draped in the usual sterile fashion.  Local anesthesia used: no    Anesthesia:  Local anesthesia used: no    Sedation:  Patient sedated: no    Patient tolerance: patient tolerated the procedure well with no immediate complications  Comments: Discussed procedure, alternatives, possible complications and answered all questions. Written consent was obtained. Patient was in sitting position. Right wrist injection performed. No complications. Patient ambulatory at discharge.    Medications administered: 1 mL lidocaine 1 %; 40 mg triamcinolone acetonide 40 MG/ML            Assessment and Plan     Diagnoses and all orders for this visit:    1. FARZAD (generalized anxiety disorder)  -     - Injection Tendon or Ligament  -     - Injection Tendon or Ligament             Follow Up     Return if symptoms worsen or fail to improve.  Patient was given instructions and counseling regarding her condition or for health maintenance advice. Please see specific information pulled into the AVS if appropriate.

## 2024-05-03 ENCOUNTER — TELEPHONE (OUTPATIENT)
Dept: FAMILY MEDICINE CLINIC | Age: 34
End: 2024-05-03
Payer: COMMERCIAL

## 2024-07-28 DIAGNOSIS — F41.1 GAD (GENERALIZED ANXIETY DISORDER): ICD-10-CM

## 2024-07-29 RX ORDER — SERTRALINE HYDROCHLORIDE 100 MG/1
100 TABLET, FILM COATED ORAL DAILY
Qty: 90 TABLET | Refills: 1 | Status: SHIPPED | OUTPATIENT
Start: 2024-07-29

## 2024-09-10 DIAGNOSIS — E55.9 VITAMIN D DEFICIENCY: ICD-10-CM

## 2024-09-10 RX ORDER — ERGOCALCIFEROL 1.25 MG/1
50000 CAPSULE, LIQUID FILLED ORAL
Qty: 4 CAPSULE | Refills: 3 | OUTPATIENT
Start: 2024-09-10

## 2024-09-13 ENCOUNTER — TELEPHONE (OUTPATIENT)
Dept: FAMILY MEDICINE CLINIC | Age: 34
End: 2024-09-13
Payer: COMMERCIAL

## 2024-09-13 NOTE — TELEPHONE ENCOUNTER
Caller: Preston Walters    Relationship to patient: Self    Best call back number: [phone]     Chief complaint: RIGHT KNEE PAIN AND BOTH WRIST PAIN.     Type of visit: OFFICE VISIT WITH WRIST INJECTIONS    Requested date: ASAP     Additional notes:PATIENT WOULD LIKE TO SCHEDULE AN APPOINTMENT TO BE SEEN AND GET INJECTIONS IN WRIST.    HUB UPDATED REGISTRATION.    PLEASE CONTACT PATIENT TO ADVISE.          MYCHART YES OR CALL MAY LEAVE VOICEMAIL.

## 2024-10-11 ENCOUNTER — OFFICE VISIT (OUTPATIENT)
Dept: FAMILY MEDICINE CLINIC | Age: 34
End: 2024-10-11
Payer: COMMERCIAL

## 2024-10-11 ENCOUNTER — LAB (OUTPATIENT)
Dept: LAB | Facility: HOSPITAL | Age: 34
End: 2024-10-11
Payer: COMMERCIAL

## 2024-10-11 VITALS
BODY MASS INDEX: 50.46 KG/M2 | SYSTOLIC BLOOD PRESSURE: 104 MMHG | DIASTOLIC BLOOD PRESSURE: 54 MMHG | WEIGHT: 284.8 LBS | HEIGHT: 63 IN | HEART RATE: 62 BPM | TEMPERATURE: 98.2 F | OXYGEN SATURATION: 98 %

## 2024-10-11 DIAGNOSIS — M25.561 CHRONIC PAIN OF RIGHT KNEE: Primary | ICD-10-CM

## 2024-10-11 DIAGNOSIS — D50.8 IRON DEFICIENCY ANEMIA SECONDARY TO INADEQUATE DIETARY IRON INTAKE: ICD-10-CM

## 2024-10-11 DIAGNOSIS — E55.9 VITAMIN D DEFICIENCY: ICD-10-CM

## 2024-10-11 DIAGNOSIS — S83.206A TEAR OF MENISCUS OF RIGHT KNEE, UNSPECIFIED MENISCUS, UNSPECIFIED TEAR TYPE, UNSPECIFIED WHETHER OLD OR CURRENT TEAR: ICD-10-CM

## 2024-10-11 DIAGNOSIS — F41.1 GAD (GENERALIZED ANXIETY DISORDER): ICD-10-CM

## 2024-10-11 DIAGNOSIS — M17.31 POST-TRAUMATIC OSTEOARTHRITIS OF RIGHT KNEE: ICD-10-CM

## 2024-10-11 DIAGNOSIS — G89.29 CHRONIC PAIN OF RIGHT KNEE: Primary | ICD-10-CM

## 2024-10-11 DIAGNOSIS — Z30.09 ENCOUNTER FOR OTHER GENERAL COUNSELING OR ADVICE ON CONTRACEPTION: ICD-10-CM

## 2024-10-11 PROBLEM — Z30.9 CONTRACEPTIVE MANAGEMENT: Status: ACTIVE | Noted: 2024-10-11

## 2024-10-11 PROBLEM — N32.81 OVERACTIVE BLADDER: Status: ACTIVE | Noted: 2024-10-11

## 2024-10-11 LAB
25(OH)D3 SERPL-MCNC: 17.2 NG/ML (ref 30–100)
BASOPHILS # BLD AUTO: 0.04 10*3/MM3 (ref 0–0.2)
BASOPHILS NFR BLD AUTO: 0.4 % (ref 0–1.5)
DEPRECATED RDW RBC AUTO: 41.7 FL (ref 37–54)
EOSINOPHIL # BLD AUTO: 0.42 10*3/MM3 (ref 0–0.4)
EOSINOPHIL NFR BLD AUTO: 4 % (ref 0.3–6.2)
ERYTHROCYTE [DISTWIDTH] IN BLOOD BY AUTOMATED COUNT: 12.7 % (ref 12.3–15.4)
HCT VFR BLD AUTO: 40.7 % (ref 34–46.6)
HGB BLD-MCNC: 12.8 G/DL (ref 12–15.9)
IMM GRANULOCYTES # BLD AUTO: 0.02 10*3/MM3 (ref 0–0.05)
IMM GRANULOCYTES NFR BLD AUTO: 0.2 % (ref 0–0.5)
IRON 24H UR-MRATE: 46 MCG/DL (ref 37–145)
IRON SATN MFR SERPL: 11 % (ref 20–50)
LYMPHOCYTES # BLD AUTO: 2.18 10*3/MM3 (ref 0.7–3.1)
LYMPHOCYTES NFR BLD AUTO: 20.7 % (ref 19.6–45.3)
MCH RBC QN AUTO: 28.3 PG (ref 26.6–33)
MCHC RBC AUTO-ENTMCNC: 31.4 G/DL (ref 31.5–35.7)
MCV RBC AUTO: 89.8 FL (ref 79–97)
MONOCYTES # BLD AUTO: 0.61 10*3/MM3 (ref 0.1–0.9)
MONOCYTES NFR BLD AUTO: 5.8 % (ref 5–12)
NEUTROPHILS NFR BLD AUTO: 68.9 % (ref 42.7–76)
NEUTROPHILS NFR BLD AUTO: 7.28 10*3/MM3 (ref 1.7–7)
PLATELET # BLD AUTO: 277 10*3/MM3 (ref 140–450)
PMV BLD AUTO: 11.6 FL (ref 6–12)
RBC # BLD AUTO: 4.53 10*6/MM3 (ref 3.77–5.28)
TIBC SERPL-MCNC: 431 MCG/DL (ref 298–536)
TRANSFERRIN SERPL-MCNC: 289 MG/DL (ref 200–360)
WBC NRBC COR # BLD AUTO: 10.55 10*3/MM3 (ref 3.4–10.8)

## 2024-10-11 PROCEDURE — 84466 ASSAY OF TRANSFERRIN: CPT | Performed by: INTERNAL MEDICINE

## 2024-10-11 PROCEDURE — 83540 ASSAY OF IRON: CPT | Performed by: INTERNAL MEDICINE

## 2024-10-11 PROCEDURE — 82306 VITAMIN D 25 HYDROXY: CPT | Performed by: INTERNAL MEDICINE

## 2024-10-11 PROCEDURE — 1125F AMNT PAIN NOTED PAIN PRSNT: CPT | Performed by: INTERNAL MEDICINE

## 2024-10-11 PROCEDURE — 99214 OFFICE O/P EST MOD 30 MIN: CPT | Performed by: INTERNAL MEDICINE

## 2024-10-11 PROCEDURE — 85025 COMPLETE CBC W/AUTO DIFF WBC: CPT | Performed by: INTERNAL MEDICINE

## 2024-10-11 PROCEDURE — 36415 COLL VENOUS BLD VENIPUNCTURE: CPT | Performed by: INTERNAL MEDICINE

## 2024-10-11 RX ORDER — OXYBUTYNIN CHLORIDE 5 MG/1
5 TABLET, EXTENDED RELEASE ORAL DAILY
Qty: 30 TABLET | Refills: 2 | Status: SHIPPED | OUTPATIENT
Start: 2024-10-11 | End: 2024-11-10

## 2024-10-11 RX ORDER — SERTRALINE HYDROCHLORIDE 100 MG/1
150 TABLET, FILM COATED ORAL DAILY
Qty: 45 TABLET | Refills: 2 | Status: SHIPPED | OUTPATIENT
Start: 2024-10-11 | End: 2024-11-10

## 2024-10-11 NOTE — ASSESSMENT & PLAN NOTE
Refer to orthopedic surgery  DME prescription given for hinged knee brace that she will need to be measured for.

## 2024-10-11 NOTE — PROGRESS NOTES
"Chief Complaint  Pain (Rt knee pain; wants knee brace \"that actually fits\"; wants referral to different ortho-pt stated she has one in mind ) and Contraception (Wants to discuss bc )    Subjective      Preston Walters is a 34 y.o. female who presents to [facility]     Patient Care Team:  Amanda Thurman APRN as PCP - General (Nurse Practitioner)  Samantha Rodríguez APRN as Nurse Practitioner (Nurse Practitioner)  Ms. Walters is a 34-year-old female with past medical history significant for generalized anxiety disorder, obesity, recent right knee injury with torn meniscus, and carpal tunnel syndrome who presents for follow-up regarding her chronic issues.  She is concerned about her right knee as she continues to have worsening pain in it and the knee brace that she was told to obtain by her orthopedic surgeon is not large enough and she is requesting prescription for an adequate fitting hinged knee brace.  She also is concerned about urinary urgency that leads to incontinence at times.  This has been ongoing throughout the deliveries of her children.  She has urinary urgency as well as frequency.  Denies any dysuria or back pain.  Also she is requesting a referral for OB/GYN if she would like to obtain advice regarding contraception as she is considering an IUD.  She is currently on progesterone only medication.  This is not well-tolerated along with her sertraline.  She is accompanied today by her .  They have 5 children at home which can be very stressful at times and anxiety inducing.  She does feel as if the sertraline works however has been having more breakthrough anxiety.    Review of Systems  Full review of systems obtained and pertinent positives states in above HPI.  Otherwise all others reviewed and are negative.    Objective   Vital Signs:   Vitals:    10/11/24 1312   BP: 104/54   BP Location: Right arm   Patient Position: Sitting   Cuff Size: Large Adult   Pulse: 62 " "  Temp: 98.2 °F (36.8 °C)   TempSrc: Oral   SpO2: 98%   Weight: 129 kg (284 lb 12.8 oz)   Height: 160 cm (62.99\")     Body mass index is 50.46 kg/m².    Wt Readings from Last 3 Encounters:   10/11/24 129 kg (284 lb 12.8 oz)   05/01/24 131 kg (289 lb)   02/06/24 135 kg (297 lb)     BP Readings from Last 3 Encounters:   10/11/24 104/54   05/01/24 127/67   02/06/24 126/63       Health Maintenance   Topic Date Due    ANNUAL PHYSICAL  Never done    PAP SMEAR  Never done    INFLUENZA VACCINE  08/01/2024    COVID-19 Vaccine (1 - 2023-24 season) Never done    BMI FOLLOWUP  09/25/2024    TDAP/TD VACCINES (2 - Td or Tdap) 02/06/2027    HEPATITIS C SCREENING  Completed    Pneumococcal Vaccine 0-64  Aged Out       Physical Exam   GEN:NAD, well nourished  HEENT:NCAT, PERRL, EOMI, OP clear, MMM  NECK:supple, no JVD, no carotid bruits  CVA:RRR s1, s2 no m/r/g  CHEST:CTA B no wheezes or rhonchi  ABD:soft, nontender, nondistended +bs  EXT:no c/c/e 2+PP B  NEURO:CN II-XII grossly nonfocal, no evidence of asterixes or tremor  PSYCH: appropriate mood and affect  :deferred  SKIN: warm, dry, intact, no lesions or rashes    Result Review   The following data was reviewed by: Iqra Duncan MD on 10/11/2024:  [x]  Tests & Results  []  Hospitalization/Emergency Department/Urgent Care  [x]  Internal/External Consultant Notes    Procedures          ASSESSMENT/PLAN  Diagnoses and all orders for this visit:    1. Chronic pain of right knee (Primary)  -     Ambulatory Referral to Orthopedic Surgery  -     Genetic Testing - Blood,; Future  -     Ambulatory Referral to Obstetrics / Gynecology    2. FARZAD (generalized anxiety disorder)    3. Tear of meniscus of right knee, unspecified meniscus, unspecified tear type, unspecified whether old or current tear  Assessment & Plan:  Refer to orthopedic surgery  DME prescription given for hinged knee brace that she will need to be measured for.    Orders:  -     Miscellaneous DME    4. Post-traumatic " osteoarthritis of right knee  Assessment & Plan:  As needed ibuprofen for pain      5. Iron deficiency anemia secondary to inadequate dietary iron intake  Assessment & Plan:  Check a CBC and iron profile.  Patient is on ferrous sulfate 325 mg.  Continue this for now    Orders:  -     Iron Profile  -     CBC w AUTO Differential    6. Vitamin D deficiency  Assessment & Plan:  Check vitamin D level.  Patient currently on supplementation    Orders:  -     Vitamin D 25 hydroxy    7. Encounter for other general counseling or advice on contraception  Comments:  Refer to OB/GYN contraception counseling.  Patient interested in IUD.  Currently she was on progesterone however we will stop this due to behavioral changes    Other orders  -     sertraline (Zoloft) 100 MG tablet; Take 1.5 tablets by mouth Daily for 30 days.  Dispense: 45 tablet; Refill: 2  -     oxybutynin XL (Ditropan XL) 5 MG 24 hr tablet; Take 1 tablet by mouth Daily for 30 days.  Dispense: 30 tablet; Refill: 2                   FOLLOW UP  Return in about 3 months (around 1/11/2025).  Patient to follow-up with BETH Peck  Patient was given instructions and counseling regarding her condition or for health maintenance advice. Please see specific information pulled into the AVS if appropriate.       Iqra Duncan MD  10/11/24  13:27 EDT

## 2024-10-17 RX ORDER — VITAMIN K2 90 MCG
1000 CAPSULE ORAL DAILY
Qty: 30 CAPSULE | Refills: 2 | Status: SHIPPED | OUTPATIENT
Start: 2024-10-17 | End: 2025-01-15

## 2024-11-15 ENCOUNTER — TELEPHONE (OUTPATIENT)
Dept: FAMILY MEDICINE CLINIC | Age: 34
End: 2024-11-15
Payer: COMMERCIAL

## 2024-11-19 ENCOUNTER — TELEPHONE (OUTPATIENT)
Dept: FAMILY MEDICINE CLINIC | Age: 34
End: 2024-11-19
Payer: COMMERCIAL

## 2024-11-19 ENCOUNTER — LAB (OUTPATIENT)
Dept: LAB | Facility: HOSPITAL | Age: 34
End: 2024-11-19
Payer: COMMERCIAL

## 2024-11-19 ENCOUNTER — OFFICE VISIT (OUTPATIENT)
Dept: FAMILY MEDICINE CLINIC | Age: 34
End: 2024-11-19
Payer: COMMERCIAL

## 2024-11-19 VITALS
DIASTOLIC BLOOD PRESSURE: 74 MMHG | TEMPERATURE: 98.4 F | SYSTOLIC BLOOD PRESSURE: 111 MMHG | BODY MASS INDEX: 49.61 KG/M2 | OXYGEN SATURATION: 98 % | WEIGHT: 280 LBS | HEART RATE: 61 BPM | HEIGHT: 63 IN

## 2024-11-19 DIAGNOSIS — D50.8 IRON DEFICIENCY ANEMIA SECONDARY TO INADEQUATE DIETARY IRON INTAKE: ICD-10-CM

## 2024-11-19 DIAGNOSIS — K30 BURNING SENSATION OF STOMACH: ICD-10-CM

## 2024-11-19 DIAGNOSIS — N39.41 URGE INCONTINENCE: ICD-10-CM

## 2024-11-19 DIAGNOSIS — E66.01 MORBID OBESITY WITH BMI OF 45.0-49.9, ADULT: ICD-10-CM

## 2024-11-19 DIAGNOSIS — K21.9 GASTROESOPHAGEAL REFLUX DISEASE, UNSPECIFIED WHETHER ESOPHAGITIS PRESENT: Primary | ICD-10-CM

## 2024-11-19 DIAGNOSIS — G89.29 CHRONIC PAIN OF RIGHT KNEE: ICD-10-CM

## 2024-11-19 DIAGNOSIS — F41.1 GAD (GENERALIZED ANXIETY DISORDER): ICD-10-CM

## 2024-11-19 DIAGNOSIS — Z13.1 SCREENING FOR DIABETES MELLITUS: ICD-10-CM

## 2024-11-19 DIAGNOSIS — M25.561 CHRONIC PAIN OF RIGHT KNEE: ICD-10-CM

## 2024-11-19 DIAGNOSIS — E55.9 VITAMIN D DEFICIENCY: ICD-10-CM

## 2024-11-19 DIAGNOSIS — R63.1 EXCESSIVE THIRST: ICD-10-CM

## 2024-11-19 LAB — HBA1C MFR BLD: 5.7 % (ref 4.8–5.6)

## 2024-11-19 PROCEDURE — 1125F AMNT PAIN NOTED PAIN PRSNT: CPT | Performed by: NURSE PRACTITIONER

## 2024-11-19 PROCEDURE — 99214 OFFICE O/P EST MOD 30 MIN: CPT | Performed by: NURSE PRACTITIONER

## 2024-11-19 PROCEDURE — 83013 H PYLORI (C-13) BREATH: CPT

## 2024-11-19 PROCEDURE — 36415 COLL VENOUS BLD VENIPUNCTURE: CPT

## 2024-11-19 PROCEDURE — 83036 HEMOGLOBIN GLYCOSYLATED A1C: CPT

## 2024-11-19 RX ORDER — OXYBUTYNIN CHLORIDE 5 MG/1
5 TABLET, EXTENDED RELEASE ORAL DAILY
COMMUNITY
Start: 2024-11-11 | End: 2024-11-19

## 2024-11-19 RX ORDER — PANTOPRAZOLE SODIUM 40 MG/1
40 TABLET, DELAYED RELEASE ORAL DAILY
Qty: 90 TABLET | Refills: 1 | Status: SHIPPED | OUTPATIENT
Start: 2024-11-19

## 2024-11-19 RX ORDER — OXYBUTYNIN CHLORIDE 10 MG/1
10 TABLET, EXTENDED RELEASE ORAL DAILY
Qty: 90 TABLET | Refills: 1 | Status: SHIPPED | OUTPATIENT
Start: 2024-11-19

## 2024-11-19 NOTE — TELEPHONE ENCOUNTER
Patient went down to the lab and they stated they do not have the kit for genetic testing. Patient would like advise where she can go?

## 2024-11-19 NOTE — PROGRESS NOTES
"Preston Walters presents to [facility] with complaint of  Headache (X 2 months with vision changes /Taking tylenol and ibuprofen for Sx /Pt concerned with blood sugar due to these Sx ) and Heartburn (Burning sensation of the stomach X 1 month )    SUBJECTIVE  History of Present Illness    Patient is here with multiple concerns she would like to address today.  First is headache that has been off and on for the past 2 months, increased urination and thirst. She uses Tylenol and ibuprofen a few times a week which is effective. She does have any other reported neurological symptoms.  Patient is concerned that he may be a blood sugar issue.  She does admit to skipping meals as she is a busy mom of 5.    Patient also has increased urinary incontinence and urge.  This been an issue for over a year.  She was on Ditropan for a couple of months but medication has not provided any relief in her urinary symptoms.    Patient also has heartburn and burning sensation in her stomach for the past month.  She denies any abdominal pain nausea vomiting constipation diarrhea.    Patient was seen in the office October 11 by Dr. Duncan for right knee pain requesting knee brace, anxiety follow-up, vitamin D deficiency, and iron deficiency.  Patient is currently on vitamin D daily and daily iron supplement.     She has been followed by orthopedics, Dr. Trujillo for meniscal tear of the right knee.    She has an appointment in January to see OB/GYN.  She was using over-the-counter progesterone but this was affecting her mood so she stopped taking this.  Her  is scheduled to have a vasectomy as they have 5 children and do not wish to have anymore children.      OBJECTIVE  Vital Signs:   /74 (BP Location: Right arm, Patient Position: Sitting, Cuff Size: Adult)   Pulse 61   Temp 98.4 °F (36.9 °C) (Oral)   Ht 160 cm (62.99\")   Wt 127 kg (280 lb)   SpO2 98%   BMI 49.62 kg/m²       Physical " Exam  Vitals reviewed.   Constitutional:       General: She is not in acute distress.     Appearance: Normal appearance. She is morbidly obese. She is not ill-appearing.   HENT:      Head: Normocephalic and atraumatic.      Nose: Nose normal.      Mouth/Throat:      Mouth: Mucous membranes are moist.      Pharynx: Oropharynx is clear.   Cardiovascular:      Rate and Rhythm: Normal rate and regular rhythm.      Pulses: Normal pulses.      Heart sounds: Normal heart sounds.   Pulmonary:      Effort: Pulmonary effort is normal.   Musculoskeletal:      Cervical back: Neck supple.   Skin:     General: Skin is warm and dry.   Neurological:      General: No focal deficit present.      Mental Status: She is alert and oriented to person, place, and time. Mental status is at baseline.   Psychiatric:         Mood and Affect: Mood normal.         Behavior: Behavior normal.         Judgment: Judgment normal.          Results Review:  The following data was reviewed by SASHA Peck [unfilled] 09:10 EST.  CBC w AUTO Differential (10/11/2024 15:03)  Vitamin D 25 hydroxy (10/11/2024 15:03)  Iron Profile (10/11/2024 15:03)    ASSESSMENT AND PLAN:  Diagnoses and all orders for this visit:    1. Gastroesophageal reflux disease, unspecified whether esophagitis present (Primary)  Assessment & Plan:  Will try Protonix, also ruling out H. pylori    Orders:  -     pantoprazole (PROTONIX) 40 MG EC tablet; Take 1 tablet by mouth Daily.  Dispense: 90 tablet; Refill: 1    2. Burning sensation of stomach  -     H. Pylori Breath Test - Breath, Lung; Future  -     pantoprazole (PROTONIX) 40 MG EC tablet; Take 1 tablet by mouth Daily.  Dispense: 90 tablet; Refill: 1    3. Vitamin D deficiency  Assessment & Plan:  On replacement      4. Iron deficiency anemia secondary to inadequate dietary iron intake  Assessment & Plan:  Hemoglobin has been normal, checking occult stool as a precaution.  On iron supplement.  Not due for iron check    Orders:  -      Occult Blood, Fecal By Immunoassay - Stool, Per Rectum; Standing    5. FARZAD (generalized anxiety disorder)  Assessment & Plan:  Stable with Zoloft 150 mg daily      6. Morbid obesity with BMI of 45.0-49.9, adult    7. Urge incontinence  -     oxybutynin XL (Ditropan XL) 10 MG 24 hr tablet; Take 1 tablet by mouth Daily.  Dispense: 90 tablet; Refill: 1  -     Ambulatory Referral to Physical Therapy for Evaluation & Treatment    8. Screening for diabetes mellitus  -     Hemoglobin A1c; Future    9. Excessive thirst  Assessment & Plan:  A1c last year was normal, she has history of gestational diabetes so is at risk for type 2 diabetes also given her weight.  Did discuss with patient that her being on Ditropan could be the reason for her excessive thirst.  Headaches are likely due to skipping meals and stress.  Reassuring that there is no other neuro symptoms, but should these occur or headaches become more severe or frequent will investigate further                   Follow Up   No follow-ups on file. Patient to notify office with any acute concerns or issues.  Patient verbalizes understanding, agrees with plan of care and has no further questions upon discharge.     Patient was given instructions and counseling regarding her condition or for health maintenance advice. Please see specific information pulled into the AVS if appropriate.     Discussed the importance of following up with any needed screening tests/labs/specialist appointments and any requested follow-up recommended by me today. Importance of maintaining follow-up discussed and patient accepts that missed appointments can delay diagnosis and potentially lead to worsening of conditions.    Part of this note may be an electronic transcription/translation of spoken language to printed text using the Dragon Dictation System.

## 2024-11-19 NOTE — ASSESSMENT & PLAN NOTE
A1c last year was normal, she has history of gestational diabetes so is at risk for type 2 diabetes also given her weight.  Did discuss with patient that her being on Ditropan could be the reason for her excessive thirst.  Headaches are likely due to skipping meals and stress.  Reassuring that there is no other neuro symptoms, but should these occur or headaches become more severe or frequent will investigate further

## 2024-11-19 NOTE — ASSESSMENT & PLAN NOTE
Hemoglobin has been normal, checking occult stool as a precaution.  On iron supplement.  Not due for iron check

## 2024-11-21 LAB — UREA BREATH TEST QL: NEGATIVE

## 2024-12-27 ENCOUNTER — TELEPHONE (OUTPATIENT)
Dept: FAMILY MEDICINE CLINIC | Age: 34
End: 2024-12-27
Payer: COMMERCIAL

## 2025-01-13 ENCOUNTER — TELEPHONE (OUTPATIENT)
Dept: FAMILY MEDICINE CLINIC | Age: 35
End: 2025-01-13
Payer: COMMERCIAL

## 2025-01-13 NOTE — TELEPHONE ENCOUNTER
Caller: Preston Walters    Relationship to patient: Self    Best call back number: [phone]    Patient is needing: PATIENT CALLED IN AND SAID SHE WOULD LIKE A NEW REFERRAL FOR ORTHOPEDICS BESIDES DR. BRITT. PATIENT ALSO SAID THAT SHE HAS BEEN HAVING SYMPTOMS OF AN ULCER AND WOULD LIKE A REFERRAL TO A GASTROENTEROLOGIST. PATIENT SAID IT IS OKAY TO LEAVE MESSAGE ON HER PHONE.

## 2025-01-13 NOTE — TELEPHONE ENCOUNTER
Pt inf she already has appt for gastro, pt stated her appt is not until April and wants a sooner appt, please adv.

## 2025-01-14 NOTE — TELEPHONE ENCOUNTER
She saw Diane at an  fast pace and that who did the referral, she states she is not seeing him as pcp, however whoever they referred her to cannot get her in for months, she is asking for you to place a referral, please adv   Admission

## 2025-01-14 NOTE — TELEPHONE ENCOUNTER
I did not place that referral, looks like SASHA Hernandez did.  Can you see who this provider is and why he referred her there?

## 2025-01-14 NOTE — TELEPHONE ENCOUNTER
Carlos Contreras is an [facility] family [facility] in [place], the referral was placed for epigastric pain.

## 2025-01-20 ENCOUNTER — OFFICE VISIT (OUTPATIENT)
Dept: FAMILY MEDICINE CLINIC | Age: 35
End: 2025-01-20
Payer: COMMERCIAL

## 2025-01-20 VITALS
TEMPERATURE: 98.4 F | OXYGEN SATURATION: 96 % | BODY MASS INDEX: 49.58 KG/M2 | SYSTOLIC BLOOD PRESSURE: 112 MMHG | DIASTOLIC BLOOD PRESSURE: 59 MMHG | WEIGHT: 279.8 LBS | HEART RATE: 61 BPM | HEIGHT: 63 IN

## 2025-01-20 DIAGNOSIS — J02.9 SORE THROAT: Primary | ICD-10-CM

## 2025-01-20 DIAGNOSIS — R49.0 HOARSENESS: ICD-10-CM

## 2025-01-20 LAB
EXPIRATION DATE: NORMAL
INTERNAL CONTROL: NORMAL
Lab: NORMAL
S PYO AG THROAT QL: NEGATIVE

## 2025-01-20 PROCEDURE — 1159F MED LIST DOCD IN RCRD: CPT | Performed by: NURSE PRACTITIONER

## 2025-01-20 PROCEDURE — 99213 OFFICE O/P EST LOW 20 MIN: CPT | Performed by: NURSE PRACTITIONER

## 2025-01-20 PROCEDURE — 1160F RVW MEDS BY RX/DR IN RCRD: CPT | Performed by: NURSE PRACTITIONER

## 2025-01-20 PROCEDURE — 87880 STREP A ASSAY W/OPTIC: CPT | Performed by: NURSE PRACTITIONER

## 2025-01-20 PROCEDURE — 1125F AMNT PAIN NOTED PAIN PRSNT: CPT | Performed by: NURSE PRACTITIONER

## 2025-01-20 PROCEDURE — 87081 CULTURE SCREEN ONLY: CPT | Performed by: NURSE PRACTITIONER

## 2025-01-20 RX ORDER — VIT C/E/ZN/COPPR/LUTEIN/ZEAXAN 250MG-90MG
1 CAPSULE ORAL DAILY
COMMUNITY
Start: 2024-12-30

## 2025-01-20 RX ORDER — BUDESONIDE 0.5 MG/2ML
1.25 INHALANT ORAL
COMMUNITY
Start: 2024-11-25

## 2025-01-20 RX ORDER — POLYSACCHARIDE-IRON COMPLEX 150 MG/1
1 CAPSULE ORAL DAILY
COMMUNITY
Start: 2024-12-30

## 2025-01-20 RX ORDER — METHYLPREDNISOLONE 4 MG/1
TABLET ORAL
Qty: 21 EACH | Refills: 0 | Status: SHIPPED | OUTPATIENT
Start: 2025-01-20

## 2025-01-20 NOTE — PROGRESS NOTES
"Chief Complaint  Sore Throat (Pt thinks that she may have strep throat. Pt  had Strep about a month ago. Pt c/o voice hoarseness, and issues breathing./)    Subjective          Preston Walters presents to [facility]  History of Present Illness  Reports loss of voice that's been present for about a month.  Sore Throat   This is a new problem. The current episode started 1 to 4 weeks ago. The problem has been unchanged.   Pain is worse on left side(s). There has been no fever. Associated symptoms include coughing, diarrhea, drooling (\"a little\"), headaches, a hoarse voice, shortness of breath and trouble swallowing. Pertinent negatives include no congestion, ear pain, swollen glands or vomiting. Treatments tried: Mucinex, cough syrup, Tessalon Perles. The treatment provided no relief.       Objective   Vital Signs:   /59 (BP Location: Left arm, Patient Position: Sitting)   Pulse 61   Temp 98.4 °F (36.9 °C) (Oral)   Ht 160 cm (62.99\")   Wt 127 kg (279 lb 12.8 oz)   SpO2 96% Comment: room air  BMI 49.58 kg/m²     Physical Exam  Constitutional:       Appearance: Normal appearance. She is obese.   HENT:      Head: Normocephalic.      Right Ear: Tympanic membrane, ear canal and external ear normal.      Left Ear: Tympanic membrane, ear canal and external ear normal.      Nose: Nose normal.      Mouth/Throat:      Mouth: Mucous membranes are moist.      Pharynx: Oropharynx is clear. No oropharyngeal exudate or posterior oropharyngeal erythema.   Eyes:      Conjunctiva/sclera: Conjunctivae normal.      Pupils: Pupils are equal, round, and reactive to light.   Cardiovascular:      Rate and Rhythm: Normal rate and regular rhythm.      Pulses: Normal pulses.      Heart sounds: Normal heart sounds.   Pulmonary:      Effort: Pulmonary effort is normal.      Breath sounds: Normal breath sounds.   Musculoskeletal:      Cervical back: Normal range of motion.   Lymphadenopathy:      " Cervical: No cervical adenopathy.   Neurological:      Mental Status: She is alert and oriented to person, place, and time.   Psychiatric:         Mood and Affect: Mood normal.         Behavior: Behavior normal.         Thought Content: Thought content normal.        Result Review :   The following data was reviewed by: SASHA Lynch on 01/20/2025:                  Assessment and Plan    Diagnoses and all orders for this visit:    1. Sore throat (Primary)  -     POCT rapid strep A  -     Beta Strep Culture, Throat - , Throat; Future  -     Beta Strep Culture, Throat - Swab, Throat  -     methylPREDNISolone (MEDROL) 4 MG dose pack; Take as directed on package instructions.  Dispense: 21 each; Refill: 0  -     Ambulatory Referral to ENT (Otolaryngology)    2. Hoarseness  -     methylPREDNISolone (MEDROL) 4 MG dose pack; Take as directed on package instructions.  Dispense: 21 each; Refill: 0  -     Ambulatory Referral to ENT (Otolaryngology)    She was negative for strep in office today.  Will send her throat swab off for culture.  We have discussed that if she is having difficulty breathing and/or cannot swallow, she is go to the ED immediately.  She verbalized understanding.  There is no obvious swelling in her neck.  Inspection of her throat did not reveal anything concerning.  She does not have a fever.  She reports that her mouth is dry.  Will send in a Medrol Dosepak to see if that will help with the hoarseness.  I have also placed a referral to ENT.  Will have her follow-up in 1 week with her PCP for reassessment.            Follow Up   Return in about 1 week (around 1/27/2025).  Patient was given instructions and counseling regarding her condition or for health maintenance advice. Please see specific information pulled into the AVS if appropriate.

## 2025-01-22 LAB — BACTERIA SPEC AEROBE CULT: NORMAL

## 2025-01-29 DIAGNOSIS — D50.8 IRON DEFICIENCY ANEMIA SECONDARY TO INADEQUATE DIETARY IRON INTAKE: ICD-10-CM

## 2025-01-29 DIAGNOSIS — E55.9 VITAMIN D DEFICIENCY: Primary | ICD-10-CM

## 2025-01-29 RX ORDER — VIT C/E/ZN/COPPR/LUTEIN/ZEAXAN 250MG-90MG
1000 CAPSULE ORAL DAILY
Status: CANCELLED | OUTPATIENT
Start: 2025-01-29

## 2025-01-29 RX ORDER — POLYSACCHARIDE-IRON COMPLEX 150 MG/1
150 CAPSULE ORAL DAILY
Status: CANCELLED | OUTPATIENT
Start: 2025-01-29

## 2025-01-29 NOTE — TELEPHONE ENCOUNTER
Rx Refill Note  Requested Prescriptions     Pending Prescriptions Disp Refills    IFerex 150 150 MG capsule       Sig: Take 1 capsule by mouth Daily.    FT Vitamin D3 25 MCG capsule       Sig: Take 1 capsule by mouth Daily.      Last office visit with prescribing clinician: 11/19/2024     Next office visit with prescribing clinician: Visit date not found    Appointment with Amanda Thurman APRN (01/27/2025)     FT Vitamin D3 25 MCG capsule (12/30/2024)     IFerex 150 150 MG capsule (12/30/2024)     Vitamin D 25 hydroxy (10/11/2024 15:03)     Iron Profile (10/11/2024 15:03)     Mindy Avila LPN  01/29/25, 16:07 EST

## 2025-02-11 ENCOUNTER — TELEPHONE (OUTPATIENT)
Dept: FAMILY MEDICINE CLINIC | Age: 35
End: 2025-02-11
Payer: COMMERCIAL

## 2025-02-11 NOTE — TELEPHONE ENCOUNTER
Called pt about no show, left voicemail for call back and informed them of no show policy. I am mailing pt a letter- 02/11/2025 AB

## 2025-02-28 ENCOUNTER — TELEPHONE (OUTPATIENT)
Dept: FAMILY MEDICINE CLINIC | Age: 35
End: 2025-02-28
Payer: COMMERCIAL

## 2025-04-08 ENCOUNTER — TELEPHONE (OUTPATIENT)
Dept: FAMILY MEDICINE CLINIC | Age: 35
End: 2025-04-08

## 2025-04-08 ENCOUNTER — LAB (OUTPATIENT)
Dept: LAB | Facility: HOSPITAL | Age: 35
End: 2025-04-08
Payer: COMMERCIAL

## 2025-04-08 ENCOUNTER — OFFICE VISIT (OUTPATIENT)
Dept: FAMILY MEDICINE CLINIC | Age: 35
End: 2025-04-08
Payer: COMMERCIAL

## 2025-04-08 VITALS
TEMPERATURE: 97.6 F | SYSTOLIC BLOOD PRESSURE: 113 MMHG | HEIGHT: 63 IN | HEART RATE: 65 BPM | WEIGHT: 278 LBS | BODY MASS INDEX: 49.26 KG/M2 | DIASTOLIC BLOOD PRESSURE: 65 MMHG | OXYGEN SATURATION: 100 %

## 2025-04-08 DIAGNOSIS — D50.8 IRON DEFICIENCY ANEMIA SECONDARY TO INADEQUATE DIETARY IRON INTAKE: ICD-10-CM

## 2025-04-08 DIAGNOSIS — F41.1 GAD (GENERALIZED ANXIETY DISORDER): ICD-10-CM

## 2025-04-08 DIAGNOSIS — R45.851 SUICIDAL THOUGHTS: Primary | ICD-10-CM

## 2025-04-08 DIAGNOSIS — F33.2 SEVERE EPISODE OF RECURRENT MAJOR DEPRESSIVE DISORDER, WITHOUT PSYCHOTIC FEATURES: ICD-10-CM

## 2025-04-08 DIAGNOSIS — E55.9 VITAMIN D DEFICIENCY: ICD-10-CM

## 2025-04-08 LAB
25(OH)D3 SERPL-MCNC: 15.5 NG/ML (ref 30–100)
BASOPHILS # BLD AUTO: 0.03 10*3/MM3 (ref 0–0.2)
BASOPHILS NFR BLD AUTO: 0.3 % (ref 0–1.5)
DEPRECATED RDW RBC AUTO: 40.4 FL (ref 37–54)
EOSINOPHIL # BLD AUTO: 0.3 10*3/MM3 (ref 0–0.4)
EOSINOPHIL NFR BLD AUTO: 2.9 % (ref 0.3–6.2)
ERYTHROCYTE [DISTWIDTH] IN BLOOD BY AUTOMATED COUNT: 12.5 % (ref 12.3–15.4)
FERRITIN SERPL-MCNC: 70.8 NG/ML (ref 13–150)
HCT VFR BLD AUTO: 41.7 % (ref 34–46.6)
HGB BLD-MCNC: 13.2 G/DL (ref 12–15.9)
IMM GRANULOCYTES # BLD AUTO: 0.04 10*3/MM3 (ref 0–0.05)
IMM GRANULOCYTES NFR BLD AUTO: 0.4 % (ref 0–0.5)
IRON 24H UR-MRATE: 77 MCG/DL (ref 37–145)
IRON SATN MFR SERPL: 17 % (ref 20–50)
LYMPHOCYTES # BLD AUTO: 1.9 10*3/MM3 (ref 0.7–3.1)
LYMPHOCYTES NFR BLD AUTO: 18.1 % (ref 19.6–45.3)
MCH RBC QN AUTO: 27.8 PG (ref 26.6–33)
MCHC RBC AUTO-ENTMCNC: 31.7 G/DL (ref 31.5–35.7)
MCV RBC AUTO: 88 FL (ref 79–97)
MONOCYTES # BLD AUTO: 0.62 10*3/MM3 (ref 0.1–0.9)
MONOCYTES NFR BLD AUTO: 5.9 % (ref 5–12)
NEUTROPHILS NFR BLD AUTO: 7.61 10*3/MM3 (ref 1.7–7)
NEUTROPHILS NFR BLD AUTO: 72.4 % (ref 42.7–76)
NRBC BLD AUTO-RTO: 0 /100 WBC (ref 0–0.2)
PLATELET # BLD AUTO: 271 10*3/MM3 (ref 140–450)
PMV BLD AUTO: 12.6 FL (ref 6–12)
RBC # BLD AUTO: 4.74 10*6/MM3 (ref 3.77–5.28)
TIBC SERPL-MCNC: 441 MCG/DL (ref 298–536)
TRANSFERRIN SERPL-MCNC: 296 MG/DL (ref 200–360)
WBC NRBC COR # BLD AUTO: 10.5 10*3/MM3 (ref 3.4–10.8)

## 2025-04-08 PROCEDURE — 82728 ASSAY OF FERRITIN: CPT

## 2025-04-08 PROCEDURE — 1125F AMNT PAIN NOTED PAIN PRSNT: CPT | Performed by: NURSE PRACTITIONER

## 2025-04-08 PROCEDURE — 99214 OFFICE O/P EST MOD 30 MIN: CPT | Performed by: NURSE PRACTITIONER

## 2025-04-08 PROCEDURE — 83540 ASSAY OF IRON: CPT

## 2025-04-08 PROCEDURE — 84466 ASSAY OF TRANSFERRIN: CPT

## 2025-04-08 PROCEDURE — 82306 VITAMIN D 25 HYDROXY: CPT

## 2025-04-08 PROCEDURE — 85025 COMPLETE CBC W/AUTO DIFF WBC: CPT

## 2025-04-08 PROCEDURE — 36415 COLL VENOUS BLD VENIPUNCTURE: CPT

## 2025-04-08 RX ORDER — SERTRALINE HYDROCHLORIDE 100 MG/1
150 TABLET, FILM COATED ORAL DAILY
Qty: 135 TABLET | Refills: 1 | Status: SHIPPED | OUTPATIENT
Start: 2025-04-08 | End: 2025-07-07

## 2025-04-08 RX ORDER — SERTRALINE HYDROCHLORIDE 100 MG/1
150 TABLET, FILM COATED ORAL DAILY
Qty: 135 TABLET | Refills: 1 | Status: SHIPPED | OUTPATIENT
Start: 2025-04-08 | End: 2025-04-08 | Stop reason: SDUPTHER

## 2025-04-08 NOTE — Clinical Note
Can you please have [facility] cancel the Zoloft, pt changed her mind and wanted sent to [facility], thanks

## 2025-04-08 NOTE — PROGRESS NOTES
"Preston Walters presents to [facility] with complaint of  Depression, Anxiety, and Suicidal (Suicidal thoughts )    SUBJECTIVE  History of Present Illness    Patient is here to discuss depression and anxiety.  She is also having suicidal thoughts.  Patient says these are the worse her suicidal thoughts have ever probably ever been.  Patient says that she would not act on these thoughts but says things like she feels like she would be better off if she was not here.  Patient says that the only reason she has not seriously contemplated suicide is because she knows that she needs to be here to be a mom to her 5 children.  Patient denies having a suicidal plan.  Patient says that she is dealt with depression for the past 20 years.  She has been on multiple antidepressants.  Patient has debated going to [facility] or some other unit for voluntary admission however patient does not wish to do this as she has a bad experience in her childhood with her mom being repeatedly admitted to psych units.  Patient says that she does not want to set this as an example to her kids.  Most recently, she was doing well on Zoloft 150 mg daily however the patient ran out of her medication a couple of weeks ago.  Patient has history of repeatedly no showing and canceling her appointments.  She is present with her  today.      OBJECTIVE  Vital Signs:   /65 (BP Location: Right arm, Patient Position: Sitting, Cuff Size: Adult)   Pulse 65   Temp 97.6 °F (36.4 °C) (Oral)   Ht 160 cm (62.99\")   Wt 126 kg (278 lb)   SpO2 100%   BMI 49.26 kg/m²       Physical Exam  Vitals reviewed.   Constitutional:       General: She is not in acute distress.     Appearance: Normal appearance. She is morbidly obese. She is not ill-appearing.   HENT:      Head: Normocephalic and atraumatic.   Pulmonary:      Effort: Pulmonary effort is normal.   Skin:     General: Skin is warm and dry.   Neurological:      " General: No focal deficit present.      Mental Status: She is alert and oriented to person, place, and time. Mental status is at baseline.   Psychiatric:         Mood and Affect: Mood is depressed. Affect is tearful.         Behavior: Behavior normal.         Thought Content: Thought content includes suicidal ideation. Thought content does not include suicidal plan.         Judgment: Judgment normal.            ASSESSMENT AND PLAN:  Diagnoses and all orders for this visit:    1. Suicidal thoughts (Primary)  -     Ambulatory Referral to Psychiatry  -     Discontinue: sertraline (Zoloft) 100 MG tablet; Take 1.5 tablets by mouth Daily for 90 days.  Dispense: 135 tablet; Refill: 1  -     sertraline (Zoloft) 100 MG tablet; Take 1 & 1/2 tablets by mouth Daily  Dispense: 135 tablet; Refill: 1    2. FARZAD (generalized anxiety disorder)  -     Ambulatory Referral to Psychiatry  -     Discontinue: sertraline (Zoloft) 100 MG tablet; Take 1.5 tablets by mouth Daily for 90 days.  Dispense: 135 tablet; Refill: 1  -     sertraline (Zoloft) 100 MG tablet; Take 1 & 1/2 tablets by mouth Daily  Dispense: 135 tablet; Refill: 1    3. Severe episode of recurrent major depressive disorder, without psychotic features  -     Ambulatory Referral to Psychiatry  -     Discontinue: sertraline (Zoloft) 100 MG tablet; Take 1.5 tablets by mouth Daily for 90 days.  Dispense: 135 tablet; Refill: 1  -     sertraline (Zoloft) 100 MG tablet; Take 1 & 1/2 tablets by mouth Daily  Dispense: 135 tablet; Refill: 1      Patient denies having a plan but she is extremely emotional and tearful in office today.  Her depression had been well-controlled up until recently.  She is on a high dose of Zoloft so I imagine that coming off of this cold turkey due to running out of refills may be contributing.  Patient is able to verbally safety contract with me today that she will not harm herself.  Discussed 988 hotline or staying with family member whenever she is having  suicidal thoughts.  Refilled Zoloft today.  Also placed urgent referral to psychiatry.  Of note, patient was also referred to psychiatry back in 2021 but never made appointment.     Also discussed with patient I am transferring to different office.  For this reason, she was scheduled an appointment with Dr. Duncan to establish care.  Also discussed no-show policy and termination from practice should she continue to no-show or have late cancels.      Follow Up   No follow-ups on file. Patient to notify office with any acute concerns or issues.  Patient verbalizes understanding, agrees with plan of care and has no further questions upon discharge.     Patient was given instructions and counseling regarding her condition or for health maintenance advice. Please see specific information pulled into the AVS if appropriate.     Discussed the importance of following up with any needed screening tests/labs/specialist appointments and any requested follow-up recommended by me today. Importance of maintaining follow-up discussed and patient accepts that missed appointments can delay diagnosis and potentially lead to worsening of conditions.    Part of this note may be an electronic transcription/translation of spoken language to printed text using the Dragon Dictation System.

## 2025-04-10 ENCOUNTER — TELEPHONE (OUTPATIENT)
Dept: FAMILY MEDICINE CLINIC | Age: 35
End: 2025-04-10
Payer: COMMERCIAL

## 2025-04-10 NOTE — TELEPHONE ENCOUNTER
HUB TO READ, Called pt about no show, left voicemail for call back and informed them of no show policy. I am mailing pt a letter- 4/10/25 AB

## 2025-04-14 ENCOUNTER — TELEMEDICINE (OUTPATIENT)
Dept: PSYCHIATRY | Facility: CLINIC | Age: 35
End: 2025-04-14
Payer: COMMERCIAL

## 2025-04-14 DIAGNOSIS — F33.1 MAJOR DEPRESSIVE DISORDER, RECURRENT EPISODE, MODERATE: Primary | ICD-10-CM

## 2025-04-14 DIAGNOSIS — F41.1 GAD (GENERALIZED ANXIETY DISORDER): ICD-10-CM

## 2025-04-14 PROCEDURE — 90791 PSYCH DIAGNOSTIC EVALUATION: CPT | Performed by: COUNSELOR
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
## 2025-04-18 ENCOUNTER — TELEPHONE (OUTPATIENT)
Dept: FAMILY MEDICINE CLINIC | Age: 35
End: 2025-04-18
Payer: COMMERCIAL

## 2025-04-18 NOTE — TELEPHONE ENCOUNTER
HUB TO READ, called pt due to late cancel, left voicemail for call back, mailing pt a letter- 4/18/25 AB

## 2025-04-21 ENCOUNTER — TELEMEDICINE (OUTPATIENT)
Dept: PSYCHIATRY | Facility: CLINIC | Age: 35
End: 2025-04-21
Payer: COMMERCIAL

## 2025-04-21 DIAGNOSIS — F41.1 GENERALIZED ANXIETY DISORDER: Primary | ICD-10-CM

## 2025-04-21 PROCEDURE — 90837 PSYTX W PT 60 MINUTES: CPT | Performed by: COUNSELOR

## 2025-04-21 NOTE — PROGRESS NOTES
Mode of Visit: Video  Location of patient: -HOME-  Location of provider: +HOME+  You have chosen to receive care through a telehealth visit.  The patient has signed the video visit consent form.  The visit included audio and video interaction. No technical issues occurred during this visit.Date: 2025  Time In: 11:03 EDT  Time out: 12:00 EDT    This provider is located at [facility], [address] [place] [address], [facility], using a secure [employer] Video Visit through [employer]. Patient is being seen remotely via telehealth at their home address is located in [place]. Patient stated they are in a secure environment for this session. The patient's condition being diagnosed and treated is appropriate for telemedicine. The provider identified themself as well as their credentials. The patient consent to be seen remotely, and when consent is given they understand that the consent allows for patient identifiable information to be sent to a third party as needed. They may refuse to be seen remotely at any time. The electronic data is encrypted and password protected, and the patient's  legal guardian has been advised of the potential risks to privacy not withstanding such measures.   PT Identifiers used: Name and .    You have chosen to receive care through a telehealth visit.  Do you consent to use a video/audio connection for your medical care today? Yes    Subjective   Preston Walters is a 34 y.o. female who presents today for follow up for issues with anxiety and depression.    Chief Complaint:   Chief Complaint   Patient presents with    Anxiety    Depression        History of Present Illness:   Anxiety  Presents for follow-up visit.  Symptoms include decreased concentration, depressed mood, excessive worry, irritability, nervous/anxious behavior, panic and restlessness. Symptoms occur most days. The severity of symptoms is causing significant distress. The treatment provided no relief.           Clinical Maneuvering/Intervention:      Assisted patient in processing above session content; acknowledged and normalized patient’s thoughts, feelings, and concerns.  Rationalized patient thought process regarding concerns presented at session.  Discussed triggers associated with patient's  anxiety . Also discussed coping skills for patient to implement such as mindfulness , positive self talk , and checking for local resources for housing and childcare.     Allowed patient to freely discuss issues without interruption or judgment. Provided safe, confidential environment to facilitate the development of positive therapeutic relationship and encourage open, honest communication. Assisted patient in identifying risk factors which would indicate the need for higher level of care including thoughts to harm self or others and/or self-harming behavior and encouraged patient to contact this office, call 911, or present to the nearest emergency room should any of these events occur. Discussed crisis intervention services and means to access. Patient adamantly and convincingly denies current suicidal or homicidal ideation or perceptual disturbance.    Current Outpatient Medications   Medication Sig Dispense Refill    albuterol sulfate  (90 Base) MCG/ACT inhaler Inhale 2 puffs Every 4 (Four) Hours As Needed for Wheezing. 18 g 0    budesonide (PULMICORT) 0.5 MG/2ML nebulizer solution Inhale 5 mL As Needed.      FT Vitamin D3 25 MCG capsule Take 1 capsule by mouth Daily. (Patient not taking: Reported on 4/8/2025)      pantoprazole (PROTONIX) 40 MG EC tablet Take 1 tablet by mouth Daily. (Patient not taking: Reported on 4/8/2025) 90 tablet 1    sertraline (Zoloft) 100 MG tablet Take 1 & 1/2 tablets by mouth Daily 135 tablet 1    vitamin D (ERGOCALCIFEROL) 1.25 MG (41519 UT) capsule capsule Take 1 capsule by mouth Every 7 (Seven) Days. 4 capsule 3     No current facility-administered medications for this visit.         Assessment: Patient reported no active suicidal ideations, self-injurious behaviors, or homicidal ideations.  Patient reported poor sleeping patterns and appetite.  Therapist continued to use Rogerian focused interventions with patient to continue to build rapport and to provide unconditional positive regard.  Patient and therapist continue to explore triggers for anxiety and depression with patient identifying interpersonal relationship stress within her fiance, and with her parents as primary triggers.  Patient and therapist continue to explore and process these triggers while discussing appropriate coping strategies including the use of healthy boundaries, and increase in healthy communication, and increase in daily self-care, and the identification and replacement of negative thought processes with positive and realistic thought processes through the use of cognitive behavioral strategies.     Patient appears to maintain relative stability as compared to their baseline.  However, patient continues to struggle with   Chief Complaint   Patient presents with    Anxiety    Depression    which continues to cause impairment in important areas of functioning.  A result, they can be reasonably expected to continue to benefit from treatment and would likely be at increased risk for decompensation otherwise.      MENTAL STATUS EXAM   General Appearance:  Cleanly groomed and dressed  Eye Contact:  Good eye contact  Attitude:  Cooperative and polite  Motor Activity:  Normal gait, posture  Speech:  Normal rate, tone, volume  Language:  Stereotypical  Mood and affect:  Normal, pleasant, appropriate, anxious and irritable  Hopelessness:  5  Loneliness: Denies  Thought Process:  Logical  Associations/ Thought Content:  No delusions  Hallucinations:  None  Suicidal Ideations:  Not present  Homicidal Ideation:  Not present  Sensorium:  Alert and clear  Orientation:  Person, place, time and situation  Immediate Recall, Recent,  and Remote Memory:  Intact  Attention Span/ Concentration:  Good  Fund of Knowledge:  Appropriate for age and educational level  Intellectual Functioning:  Average range  Insight:  Good  Judgement:  Good  Reliability:  Good  Impulse Control:  Good         Patient's Support Network Includes:   Patient reports minimal support stating her home environment with her parents makes it difficult to develop and maintain relationships. Patient reports that her home environment is also causing discord in her relationship with her fiance.     Progress toward goal: Not at goal    Prognosis: Guarded with Ongoing Treatment    Plan:   Patient will continue in individual outpatient therapy with focus on improved functioning and coping skills, maintaining stability, and avoiding decompensation and the need for higher level of care.    Patient will adhere to medication regimen as prescribed and report any side effects. Patient will contact this office, call 911 or present to the nearest emergency room should suicidal or homicidal ideations occur. Provide Cognitive Behavioral Therapy and Solution Focused Therapy to improve functioning, maintain stability, and avoid decompensation and the need for higher level of care.     Return in 1 week (on 4/28/2025) for Next scheduled follow up, Video visit.      VISIT DIAGNOSIS:    Diagnosis Plan   1. Generalized anxiety disorder             This complexity of interaction during this service was effected by the following factor(s) : managing maladaptive communication (related to, e.g., depression, PTSD, high anxiety, high reactivity, repeated questions, or disagreement) among participants complicating delivery of care         This document has been electronically signed by HILARIA Hernandez  April 21, 2025      Part of this note may be an electronic transcription/translation of spoken language to printed text using the Dragon Dictation System.

## 2025-05-05 ENCOUNTER — TELEPHONE (OUTPATIENT)
Dept: PSYCHIATRY | Facility: CLINIC | Age: 35
End: 2025-05-05
Payer: COMMERCIAL

## 2025-05-05 NOTE — TELEPHONE ENCOUNTER
Pt had sent an appt cancellation through the [facility] asking to reschedule for a new time.  Attempted to call pt had to leave a vm asking the pt to call the office back to reschedule.      Appointment canceled for Preston Walters ([id number])  Visit type: VIRTUAL PSYCHOTHERAPY  5/6/2025 2:00 PM (60 minutes) with Alyssa CAMPBELL in [facility]     Reason for cancellation: Other     Patient comments: Unfortunately, I have traffic court at the same time Tuesday. I would like to reschedule please.